# Patient Record
Sex: MALE | Race: WHITE | NOT HISPANIC OR LATINO | Employment: OTHER | URBAN - METROPOLITAN AREA
[De-identification: names, ages, dates, MRNs, and addresses within clinical notes are randomized per-mention and may not be internally consistent; named-entity substitution may affect disease eponyms.]

---

## 2017-02-10 ENCOUNTER — HOSPITAL ENCOUNTER (OUTPATIENT)
Dept: RADIOLOGY | Facility: MEDICAL CENTER | Age: 32
End: 2017-02-10
Attending: INTERNAL MEDICINE
Payer: MEDICARE

## 2017-02-10 DIAGNOSIS — R91.1 INCIDENTAL LUNG NODULE, LESS THAN OR EQUAL TO 3MM: ICD-10-CM

## 2017-02-10 PROCEDURE — 71250 CT THORAX DX C-: CPT

## 2017-02-14 ENCOUNTER — TELEPHONE (OUTPATIENT)
Dept: PULMONOLOGY | Facility: HOSPICE | Age: 32
End: 2017-02-14

## 2017-02-14 NOTE — TELEPHONE ENCOUNTER
1. Caller Name: Marty                      Call Back Number: [phone] (home)     2. Message: Marty called to make sure we had his CT he did this month. I informed him that Dr. Brady wanted him to follow up after his sleep study. Marty let me know he was not able to do the sleep study due to  issues but stated things should be changing sooner.    3. Patient approves office to leave a detailed voicemail/MyChart message: N\A

## 2017-02-16 DIAGNOSIS — R91.8 MULTIPLE LUNG NODULES: ICD-10-CM

## 2017-06-15 ENCOUNTER — HOSPITAL ENCOUNTER (EMERGENCY)
Facility: MEDICAL CENTER | Age: 32
End: 2017-06-15
Attending: EMERGENCY MEDICINE
Payer: MEDICARE

## 2017-06-15 VITALS
HEART RATE: 71 BPM | BODY MASS INDEX: 44.1 KG/M2 | OXYGEN SATURATION: 97 % | DIASTOLIC BLOOD PRESSURE: 67 MMHG | HEIGHT: 71 IN | WEIGHT: 315 LBS | TEMPERATURE: 97.5 F | SYSTOLIC BLOOD PRESSURE: 104 MMHG | RESPIRATION RATE: 20 BRPM

## 2017-06-15 DIAGNOSIS — G43.009 MIGRAINE WITHOUT AURA AND WITHOUT STATUS MIGRAINOSUS, NOT INTRACTABLE: ICD-10-CM

## 2017-06-15 DIAGNOSIS — B02.9 HERPES ZOSTER WITHOUT COMPLICATION: ICD-10-CM

## 2017-06-15 PROCEDURE — 99284 EMERGENCY DEPT VISIT MOD MDM: CPT

## 2017-06-15 PROCEDURE — 96374 THER/PROPH/DIAG INJ IV PUSH: CPT

## 2017-06-15 PROCEDURE — 96375 TX/PRO/DX INJ NEW DRUG ADDON: CPT

## 2017-06-15 PROCEDURE — 700105 HCHG RX REV CODE 258: Performed by: EMERGENCY MEDICINE

## 2017-06-15 PROCEDURE — 700111 HCHG RX REV CODE 636 W/ 250 OVERRIDE (IP): Performed by: EMERGENCY MEDICINE

## 2017-06-15 RX ORDER — METOCLOPRAMIDE 10 MG/1
10 TABLET ORAL 4 TIMES DAILY PRN
Qty: 20 TAB | Refills: 0 | Status: SHIPPED | OUTPATIENT
Start: 2017-06-15 | End: 2018-02-03

## 2017-06-15 RX ORDER — HYDROCODONE BITARTRATE AND ACETAMINOPHEN 5; 325 MG/1; MG/1
1 TABLET ORAL 2 TIMES DAILY PRN
Status: SHIPPED | COMMUNITY
End: 2019-08-16

## 2017-06-15 RX ORDER — MORPHINE SULFATE 4 MG/ML
4 INJECTION, SOLUTION INTRAMUSCULAR; INTRAVENOUS ONCE
Status: COMPLETED | OUTPATIENT
Start: 2017-06-15 | End: 2017-06-15

## 2017-06-15 RX ORDER — KETOROLAC TROMETHAMINE 30 MG/ML
30 INJECTION, SOLUTION INTRAMUSCULAR; INTRAVENOUS ONCE
Status: COMPLETED | OUTPATIENT
Start: 2017-06-15 | End: 2017-06-15

## 2017-06-15 RX ORDER — SODIUM CHLORIDE 9 MG/ML
1000 INJECTION, SOLUTION INTRAVENOUS ONCE
Status: COMPLETED | OUTPATIENT
Start: 2017-06-15 | End: 2017-06-15

## 2017-06-15 RX ORDER — TIZANIDINE 4 MG/1
4 TABLET ORAL EVERY 6 HOURS PRN
COMMUNITY

## 2017-06-15 RX ORDER — VALACYCLOVIR HYDROCHLORIDE 1 G/1
1000 TABLET, FILM COATED ORAL 3 TIMES DAILY
Qty: 21 TAB | Refills: 2 | Status: SHIPPED | OUTPATIENT
Start: 2017-06-15 | End: 2018-02-03

## 2017-06-15 RX ORDER — METOCLOPRAMIDE HYDROCHLORIDE 5 MG/ML
10 INJECTION INTRAMUSCULAR; INTRAVENOUS ONCE
Status: COMPLETED | OUTPATIENT
Start: 2017-06-15 | End: 2017-06-15

## 2017-06-15 RX ADMIN — MORPHINE SULFATE 4 MG: 4 INJECTION INTRAVENOUS at 14:02

## 2017-06-15 RX ADMIN — SODIUM CHLORIDE 1000 ML: 9 INJECTION, SOLUTION INTRAVENOUS at 13:05

## 2017-06-15 RX ADMIN — KETOROLAC TROMETHAMINE 30 MG: 30 INJECTION, SOLUTION INTRAMUSCULAR at 14:02

## 2017-06-15 RX ADMIN — METOCLOPRAMIDE 10 MG: 5 INJECTION, SOLUTION INTRAMUSCULAR; INTRAVENOUS at 13:05

## 2017-06-15 ASSESSMENT — PAIN SCALES - GENERAL
PAINLEVEL_OUTOF10: 6
PAINLEVEL_OUTOF10: 7

## 2017-06-15 NOTE — DISCHARGE INSTRUCTIONS
Migraine Headache  A migraine headache is an intense, throbbing pain on one or both sides of your head. A migraine can last for 30 minutes to several hours.  CAUSES   The exact cause of a migraine headache is not always known. However, a migraine may be caused when nerves in the brain become irritated and release chemicals that cause inflammation. This causes pain.  Certain things may also trigger migraines, such as:  · Alcohol.  · Smoking.  · Stress.  · Menstruation.  · Aged cheeses.  · Foods or drinks that contain nitrates, glutamate, aspartame, or tyramine.  · Lack of sleep.  · Chocolate.  · Caffeine.  · Hunger.  · Physical exertion.  · Fatigue.  · Medicines used to treat chest pain (nitroglycerine), birth control pills, estrogen, and some blood pressure medicines.  SIGNS AND SYMPTOMS  · Pain on one or both sides of your head.  · Pulsating or throbbing pain.  · Severe pain that prevents daily activities.  · Pain that is aggravated by any physical activity.  · Nausea, vomiting, or both.  · Dizziness.  · Pain with exposure to bright lights, loud noises, or activity.  · General sensitivity to bright lights, loud noises, or smells.  Before you get a migraine, you may get warning signs that a migraine is coming (aura). An aura may include:  · Seeing flashing lights.  · Seeing bright spots, halos, or zigzag lines.  · Having tunnel vision or blurred vision.  · Having feelings of numbness or tingling.  · Having trouble talking.  · Having muscle weakness.  DIAGNOSIS   A migraine headache is often diagnosed based on:  · Symptoms.  · Physical exam.  · A CT scan or MRI of your head. These imaging tests cannot diagnose migraines, but they can help rule out other causes of headaches.  TREATMENT  Medicines may be given for pain and nausea. Medicines can also be given to help prevent recurrent migraines.   HOME CARE INSTRUCTIONS  · Only take over-the-counter or prescription medicines for pain or discomfort as directed by your  health care provider. The use of long-term narcotics is not recommended.  · Lie down in a dark, quiet room when you have a migraine.  · Keep a journal to find out what may trigger your migraine headaches. For example, write down:  · What you eat and drink.  · How much sleep you get.  · Any change to your diet or medicines.  · Limit alcohol consumption.  · Quit smoking if you smoke.  · Get 7-9 hours of sleep, or as recommended by your health care provider.  · Limit stress.  · Keep lights dim if bright lights bother you and make your migraines worse.  SEEK IMMEDIATE MEDICAL CARE IF:   · Your migraine becomes severe.  · You have a fever.  · You have a stiff neck.  · You have vision loss.  · You have muscular weakness or loss of muscle control.  · You start losing your balance or have trouble walking.  · You feel faint or pass out.  · You have severe symptoms that are different from your first symptoms.  MAKE SURE YOU:   · Understand these instructions.  · Will watch your condition.  · Will get help right away if you are not doing well or get worse.     This information is not intended to replace advice given to you by your health care provider. Make sure you discuss any questions you have with your health care provider.     Document Released: 12/18/2006 Document Revised: 01/08/2016 Document Reviewed: 08/25/2014  [employer] Interactive Patient Education ©2016 [employer]  Shingles  Shingles, which is also known as herpes zoster, is an infection that causes a painful skin rash and fluid-filled blisters. Shingles is not related to genital herpes, which is a sexually transmitted infection.     Shingles only develops in people who:  · Have had chickenpox.  · Have received the chickenpox vaccine. (This is rare.)  CAUSES  Shingles is caused by varicella-zoster virus (VZV). This is the same virus that causes chickenpox. After exposure to VZV, the virus stays in the body in an inactive (dormant) state. Shingles develops if the  virus reactivates. This can happen many years after the initial exposure to VZV. It is not known what causes this virus to reactivate.  RISK FACTORS  People who have had chickenpox or received the chickenpox vaccine are at risk for shingles. Infection is more common in people who:  · Are older than age 50.  · Have a weakened defense (immune) system, such as those with HIV, AIDS, or cancer.  · Are taking medicines that weaken the immune system, such as transplant medicines.  · Are under great stress.  SYMPTOMS  Early symptoms of this condition include itching, tingling, and pain in an area on your skin. Pain may be described as burning, stabbing, or throbbing.  A few days or weeks after symptoms start, a painful red rash appears, usually on one side of the body in a bandlike or beltlike pattern. The rash eventually turns into fluid-filled blisters that break open, scab over, and dry up in about 2-3 weeks.  At any time during the infection, you may also develop:  · A fever.  · Chills.  · A headache.  · An upset stomach.  DIAGNOSIS  This condition is diagnosed with a skin exam. Sometimes, skin or fluid samples are taken from the blisters before a diagnosis is made. These samples are examined under a microscope or sent to a lab for testing.  TREATMENT  There is no specific cure for this condition. Your health care provider will probably prescribe medicines to help you manage pain, recover more quickly, and avoid long-term problems. Medicines may include:  · Antiviral drugs.  · Anti-inflammatory drugs.  · Pain medicines.  If the area involved is on your face, you may be referred to a specialist, such as an eye doctor (ophthalmologist) or an ear, nose, and throat (ENT) doctor to help you avoid eye problems, chronic pain, or disability.  HOME CARE INSTRUCTIONS  Medicines  · Take medicines only as directed by your health care provider.  · Apply an anti-itch or numbing cream to the affected area as directed by your health  care provider.  Blister and Rash Care  · Take a cool bath or apply cool compresses to the area of the rash or blisters as directed by your health care provider. This may help with pain and itching.  · Keep your rash covered with a loose bandage (dressing). Wear loose-fitting clothing to help ease the pain of material rubbing against the rash.  · Keep your rash and blisters clean with mild soap and cool water or as directed by your health care provider.  · Check your rash every day for signs of infection. These include redness, swelling, and pain that lasts or increases.  · Do not pick your blisters.  · Do not scratch your rash.  General Instructions  · Rest as directed by your health care provider.  · Keep all follow-up visits as directed by your health care provider. This is important.  · Until your blisters scab over, your infection can cause chickenpox in people who have never had it or been vaccinated against it. To prevent this from happening, avoid contact with other people, especially:  ¨ Babies.  ¨ Pregnant women.  ¨ Children who have eczema.  ¨ Elderly people who have transplants.  ¨ People who have chronic illnesses, such as leukemia or AIDS.  SEEK MEDICAL CARE IF:  · Your pain is not relieved with prescribed medicines.  · Your pain does not get better after the rash heals.  · Your rash looks infected. Signs of infection include redness, swelling, and pain that lasts or increases.  SEEK IMMEDIATE MEDICAL CARE IF:  · The rash is on your face or nose.  · You have facial pain, pain around your eye area, or loss of feeling on one side of your face.  · You have ear pain or you have ringing in your ear.  · You have loss of taste.  · Your condition gets worse.     This information is not intended to replace advice given to you by your health care provider. Make sure you discuss any questions you have with your health care provider.     Document Released: 12/18/2006 Document Revised: 01/08/2016 Document Reviewed:  10/29/2015  [employer] Interactive Patient Education ©2016 [employer]

## 2017-06-15 NOTE — ED AVS SNAPSHOT
Home Care Instructions                                                                                                                Marty Pedroza   MRN: [id number]    Department:  [facility], Emergency Dept   Date of Visit:  6/15/2017            [facility], Emergency Dept    [address] [place] [address]    Phone:  [phone]      You were seen by     Jamal Parkinson M.D.      Your Diagnosis Was     Migraine without aura and without status migrainosus, not intractable     G43.009       These are the medications you received during your hospitalization from 06/15/2017 1220 to 06/15/2017 1454     Date/Time Order Dose Route Action    06/15/2017 1305 NS infusion 1,000 mL 1,000 mL Intravenous New Bag    06/15/2017 1305 metoclopramide (REGLAN) injection 10 mg 10 mg Intravenous Given    06/15/2017 1402 morphine (pf) 4 mg/ml injection 4 mg 4 mg Intravenous Given    06/15/2017 1402 ketorolac (TORADOL) injection 30 mg 30 mg Intravenous Given      Follow-up Information     1. Follow up with Zack Bhakta D.N.P. In 1 week.    Specialty:  Family Medicine    Contact information    [address] [facility]  [address]  [place] [address]  [phone]        Medication Information     Review all of your home medications and newly ordered medications with your primary doctor and/or pharmacist as soon as possible. Follow medication instructions as directed by your doctor and/or pharmacist.     Please keep your complete medication list with you and share with your physician. Update the information when medications are discontinued, doses are changed, or new medications (including over-the-counter products) are added; and carry medication information at all times in the event of emergency situations.               Medication List      START taking these medications        Instructions    Morning Afternoon Evening Bedtime    metoclopramide 10 MG Tabs   Commonly known as:   REGLAN        Take 1 Tab by mouth 4 times a day as needed (nausea or headache).   Dose:  10 mg                        valacyclovir 1 GM Tabs   Commonly known as:  VALTREX        Take 1 Tab by mouth 3 times a day.   Dose:  1000 mg                          ASK your doctor about these medications        Instructions    Morning Afternoon Evening Bedtime    albuterol 108 (90 BASE) MCG/ACT Aers inhalation aerosol   Commonly known as:  PROAIR HFA        Inhale 2 Puffs by mouth every four hours as needed for Shortness of Breath (wheezing).   Dose:  2 Puff                        aspirin EC 81 MG Tbec   Commonly known as:  ECOTRIN        Take 81 mg by mouth every day.   Dose:  81 mg                        busPIRone 30 MG tablet   Commonly known as:  BUSPAR        Take 30 mg by mouth 2 Times a Day.   Dose:  30 mg                        hydrocodone-acetaminophen 5-325 MG Tabs per tablet   Commonly known as:  NORCO        Take 1 Tab by mouth every 8 hours as needed.   Dose:  1 Tab                        lisinopril 10 MG Tabs   Commonly known as:  PRINIVIL        Take 10 mg by mouth every day.   Dose:  10 mg                        lithium 300 MG Tabs   Commonly known as:  ESKALITH        Take 300 mg by mouth 5 Times a Day. 2po am 3 po qhs   Dose:  300 mg                        pantoprazole 40 MG Tbec   Commonly known as:  PROTONIX        Take 40 mg by mouth every day.   Dose:  40 mg                        pravastatin 40 MG tablet   Commonly known as:  PRAVACHOL        Take 40 mg by mouth every day.   Dose:  40 mg                        pregabalin 225 MG capsule   Commonly known as:  LYRICA        Take 225 mg by mouth 2 times a day.   Dose:  225 mg                        spironolactone 50 MG Tabs   Commonly known as:  ALDACTONE        Take 50 mg by mouth 2 times a day.   Dose:  50 mg                        tizanidine 4 MG Tabs   Commonly known as:  ZANAFLEX        Take 4 mg by mouth every 6 hours as needed.   Dose:  4 mg                         topiramate 50 MG tablet   Commonly known as:  TOPAMAX        Take 50 mg by mouth 2 times a day.   Dose:  50 mg                        TRADJENTA 5 MG Tabs tablet   Generic drug:  linagliptin        Take 5 mg by mouth every day.   Dose:  5 mg                        TRULICITY 1.5 MG/0.5ML Sopn   Generic drug:  Dulaglutide        Inject 1.5 mg as instructed every 7 days. SATURDAY   Dose:  1.5 mg                             Where to Get Your Medications      These medications were sent to [facility] [address] [place] [address] - [address] [facility]  [address] [facility] [place] [address]     Phone:  [phone]    - metoclopramide 10 MG Tabs  - valacyclovir 1 GM Tabs            Procedures and tests performed during your visit     IV SALINE LOCK        Discharge Instructions       Migraine Headache  A migraine headache is an intense, throbbing pain on one or both sides of your head. A migraine can last for 30 minutes to several hours.  CAUSES   The exact cause of a migraine headache is not always known. However, a migraine may be caused when nerves in the brain become irritated and release chemicals that cause inflammation. This causes pain.  Certain things may also trigger migraines, such as:  · Alcohol.  · Smoking.  · Stress.  · Menstruation.  · Aged cheeses.  · Foods or drinks that contain nitrates, glutamate, aspartame, or tyramine.  · Lack of sleep.  · Chocolate.  · Caffeine.  · Hunger.  · Physical exertion.  · Fatigue.  · Medicines used to treat chest pain (nitroglycerine), birth control pills, estrogen, and some blood pressure medicines.  SIGNS AND SYMPTOMS  · Pain on one or both sides of your head.  · Pulsating or throbbing pain.  · Severe pain that prevents daily activities.  · Pain that is aggravated by any physical activity.  · Nausea, vomiting, or both.  · Dizziness.  · Pain with exposure to bright lights, loud noises, or activity.  · General sensitivity to bright lights, loud noises, or  smells.  Before you get a migraine, you may get warning signs that a migraine is coming (aura). An aura may include:  · Seeing flashing lights.  · Seeing bright spots, halos, or zigzag lines.  · Having tunnel vision or blurred vision.  · Having feelings of numbness or tingling.  · Having trouble talking.  · Having muscle weakness.  DIAGNOSIS   A migraine headache is often diagnosed based on:  · Symptoms.  · Physical exam.  · A CT scan or MRI of your head. These imaging tests cannot diagnose migraines, but they can help rule out other causes of headaches.  TREATMENT  Medicines may be given for pain and nausea. Medicines can also be given to help prevent recurrent migraines.   HOME CARE INSTRUCTIONS  · Only take over-the-counter or prescription medicines for pain or discomfort as directed by your health care provider. The use of long-term narcotics is not recommended.  · Lie down in a dark, quiet room when you have a migraine.  · Keep a journal to find out what may trigger your migraine headaches. For example, write down:  · What you eat and drink.  · How much sleep you get.  · Any change to your diet or medicines.  · Limit alcohol consumption.  · Quit smoking if you smoke.  · Get 7-9 hours of sleep, or as recommended by your health care provider.  · Limit stress.  · Keep lights dim if bright lights bother you and make your migraines worse.  SEEK IMMEDIATE MEDICAL CARE IF:   · Your migraine becomes severe.  · You have a fever.  · You have a stiff neck.  · You have vision loss.  · You have muscular weakness or loss of muscle control.  · You start losing your balance or have trouble walking.  · You feel faint or pass out.  · You have severe symptoms that are different from your first symptoms.  MAKE SURE YOU:   · Understand these instructions.  · Will watch your condition.  · Will get help right away if you are not doing well or get worse.     This information is not intended to replace advice given to you by your  health care provider. Make sure you discuss any questions you have with your health care provider.     Document Released: 12/18/2006 Document Revised: 01/08/2016 Document Reviewed: 08/25/2014  [employer] Interactive Patient Education ©2016 [employer]  Shingles  Shingles, which is also known as herpes zoster, is an infection that causes a painful skin rash and fluid-filled blisters. Shingles is not related to genital herpes, which is a sexually transmitted infection.     Shingles only develops in people who:  · Have had chickenpox.  · Have received the chickenpox vaccine. (This is rare.)  CAUSES  Shingles is caused by varicella-zoster virus (VZV). This is the same virus that causes chickenpox. After exposure to VZV, the virus stays in the body in an inactive (dormant) state. Shingles develops if the virus reactivates. This can happen many years after the initial exposure to VZV. It is not known what causes this virus to reactivate.  RISK FACTORS  People who have had chickenpox or received the chickenpox vaccine are at risk for shingles. Infection is more common in people who:  · Are older than age 50.  · Have a weakened defense (immune) system, such as those with HIV, AIDS, or cancer.  · Are taking medicines that weaken the immune system, such as transplant medicines.  · Are under great stress.  SYMPTOMS  Early symptoms of this condition include itching, tingling, and pain in an area on your skin. Pain may be described as burning, stabbing, or throbbing.  A few days or weeks after symptoms start, a painful red rash appears, usually on one side of the body in a bandlike or beltlike pattern. The rash eventually turns into fluid-filled blisters that break open, scab over, and dry up in about 2-3 weeks.  At any time during the infection, you may also develop:  · A fever.  · Chills.  · A headache.  · An upset stomach.  DIAGNOSIS  This condition is diagnosed with a skin exam. Sometimes, skin or fluid samples are taken  from the blisters before a diagnosis is made. These samples are examined under a microscope or sent to a lab for testing.  TREATMENT  There is no specific cure for this condition. Your health care provider will probably prescribe medicines to help you manage pain, recover more quickly, and avoid long-term problems. Medicines may include:  · Antiviral drugs.  · Anti-inflammatory drugs.  · Pain medicines.  If the area involved is on your face, you may be referred to a specialist, such as an eye doctor (ophthalmologist) or an ear, nose, and throat (ENT) doctor to help you avoid eye problems, chronic pain, or disability.  HOME CARE INSTRUCTIONS  Medicines  · Take medicines only as directed by your health care provider.  · Apply an anti-itch or numbing cream to the affected area as directed by your health care provider.  Blister and Rash Care  · Take a cool bath or apply cool compresses to the area of the rash or blisters as directed by your health care provider. This may help with pain and itching.  · Keep your rash covered with a loose bandage (dressing). Wear loose-fitting clothing to help ease the pain of material rubbing against the rash.  · Keep your rash and blisters clean with mild soap and cool water or as directed by your health care provider.  · Check your rash every day for signs of infection. These include redness, swelling, and pain that lasts or increases.  · Do not pick your blisters.  · Do not scratch your rash.  General Instructions  · Rest as directed by your health care provider.  · Keep all follow-up visits as directed by your health care provider. This is important.  · Until your blisters scab over, your infection can cause chickenpox in people who have never had it or been vaccinated against it. To prevent this from happening, avoid contact with other people, especially:  ¨ Babies.  ¨ Pregnant women.  ¨ Children who have eczema.  ¨ Elderly people who have transplants.  ¨ People who have chronic  illnesses, such as leukemia or AIDS.  SEEK MEDICAL CARE IF:  · Your pain is not relieved with prescribed medicines.  · Your pain does not get better after the rash heals.  · Your rash looks infected. Signs of infection include redness, swelling, and pain that lasts or increases.  SEEK IMMEDIATE MEDICAL CARE IF:  · The rash is on your face or nose.  · You have facial pain, pain around your eye area, or loss of feeling on one side of your face.  · You have ear pain or you have ringing in your ear.  · You have loss of taste.  · Your condition gets worse.     This information is not intended to replace advice given to you by your health care provider. Make sure you discuss any questions you have with your health care provider.     Document Released: 12/18/2006 Document Revised: 01/08/2016 Document Reviewed: 10/29/2015  [employer] Interactive Patient Education ©2016 [employer]            Patient Information     Patient Information    Following emergency treatment: all patient requiring follow-up care must return either to a private physician or a clinic if your condition worsens before you are able to obtain further medical attention, please return to the emergency room.     Billing Information    At [facility], we work to make the billing process streamlined for our patients.  Our Representatives are here to answer any questions you may have regarding your hospital bill.  If you have insurance coverage and have supplied your insurance information to us, we will submit a claim to your insurer on your behalf.  Should you have any questions regarding your bill, we can be reached online or by phone as follows:  Online: You are able pay your bills online or live chat with our representatives about any billing questions you may have. We are here to help Monday - Friday from 8:00am to 7:30pm and 9:00am - 12:00pm on Saturdays.  Please visit [email or web address]  for more information.    Phone:  [phone] or [phone]    Please note that your emergency physician, surgeon, pathologist, radiologist, anesthesiologist, and other specialists are not employed by [facility] and will therefore bill separately for their services.  Please contact them directly for any questions concerning their bills at the numbers below:     Emergency Physician Services:  [phone]  [facility]:  [phone]  [facility]:  [phone]  [facility]:  [phone]    1. Your final bill may vary from the amount quoted upon discharge if all procedures are not complete at that time, or if your doctor has additional procedures of which we are not aware. You will receive an additional bill if you return to the Emergency Department at [facility] for suture removal regardless of the facility of which the sutures were placed.     2. Please arrange for settlement of this account at the emergency registration.    3. All self-pay accounts are due in full at the time of treatment.  If you are unable to meet this obligation then payment is expected within 4-5 days.     4. If you have had radiology studies (CT, X-ray, Ultrasound, MRI), you have received a preliminary result during your emergency department visit. Please contact the radiology department [phone] to receive a copy of your final result. Please discuss the Final result with your primary physician or with the follow up physician provided.     Crisis Hotline:  [place] Crisis Hotline:  4-622-UZMWEPI or 1-530.108.1344  [place] Crisis Hotline:    1-704.187.6236 or 236-230-6643         ED Discharge Follow Up Questions    1. In order to provide you with very good care, we would like to follow up with a phone call in the next few days.  May we have your permission to contact you?     YES /  NO    2. What is the best phone number to call you? (       )_____-__________    3. What is the best time to call you?       Morning  /  Afternoon  /  Evening                   Patient Signature:  ____________________________________________________________    Date:  ____________________________________________________________

## 2017-06-15 NOTE — ED NOTES
Reports exacerbation of chronic headache for the past two days, after a two-year hiatus.  Pt has been followed by Neurology with a Dx of migraine.

## 2017-06-15 NOTE — ED PROVIDER NOTES
ED Provider Note    CHIEF COMPLAINT  Chief Complaint   Patient presents with   • Migraine       HPI  Marty Pedroza is a 32 y.o. male who presents with headache. Started 3 days ago. He woke up with it feels like migraines have been the past. He's been relatively migraine free for the past 2 years since she's been on Topamax. This is the 1st headache that he said that will resolve. He has some photophobia and nausea and has had vomiting. He has no fever chills no thunderclap headache no neck pain. He also has another complaint of rash that he noticed on his right chest wall it's a burning sensation started with itching. He has no abdominal pain no numbness tingling weakness all other systems are negative    REVIEW OF SYSTEMS  See HPI for further details    PAST MEDICAL HISTORY  Past Medical History   Diagnosis Date   • Hypertension    • Degenerative disk disease    • Arthritis    • Hyperlipemia    • Bipolar 1 disorder    • Anxiety disorder    • PTSD (post-traumatic stress disorder)    • Depression    • Indigestion    • Snoring    • Renal disorder      stones   • Enlarged heart    • Unspecified asthma    • Sleep apnea      uses CPAP   • Anesthesia      wakes up violent   • Unspecified hemorrhagic conditions      slow to stop bleeding when cut or with IV's   • Type II or unspecified type diabetes mellitus without mention of complication, not stated as uncontrolled      oral meds   • Heart burn    • Pain      back and knee   • Breath shortness      with exertion   • Pancreatitis 3/2014   • Chronic obstructive pulmonary disease (CMS-HCC)    • Cancer (CMS-HCC)      skin ca bridge of nose   • GERD (gastroesophageal reflux disease) 7/21/2016   • Vitamin D deficiency 7/21/2016   • Prolapsed lumbosacral intervertebral disc 7/21/2016   • DJD (degenerative joint disease) 7/21/2016   • Fibromyalgia 7/21/2016   • Carpal tunnel syndrome 7/21/2016   • Peptic ulcer disease 7/21/2016   • Peripheral neuropathy 7/21/2016   •  "Tobacco abuse 7/21/2016   • Chronic back pain 7/21/2016   • Nephrolithiasis 7/21/2016   • Diabetes mellitus, type II (CMS-HCC) 7/21/2016   • Rheumatoid arthritis (CMS-HCC)    • Pneumonia    • Obesity    • Kidney stone    • Chickenpox    • Influenza    • Skin cancer        FAMILY HISTORY  Family History   Problem Relation Age of Onset   • Depression Mother    • Anxiety disorder Mother    • Bipolar disorder Mother    • Seizures Mother    • Heart Attack Father 28   • Diabetes Father    • Hypertension Father        SOCIAL HISTORY  Social History     Social History   • Marital Status:      Spouse Name: N/A   • Number of Children: N/A   • Years of Education: N/A     Social History Main Topics   • Smoking status: Current Every Day Smoker -- 1.00 packs/day for 8 years     Types: Cigarettes   • Smokeless tobacco: Never Used   • Alcohol Use: No   • Drug Use: No   • Sexual Activity: Not Asked     Other Topics Concern   • None     Social History Narrative       SURGICAL HISTORY  Past Surgical History   Procedure Laterality Date   • Tonsillectomy     • Other orthopedic surgery  2001     left knee, lateral release   • Other orthopedic surgery  2002     realign left knee   • Cystoscopy stent placement  5/22/2014     Performed by Dagoberto Everett M.D. at SURGERY [facility]   • Ureteroscopy  5/22/2014     Performed by Dagoberto Everett M.D. at SURGERY [facility]   • Lasertripsy  5/22/2014     Performed by Dagoberto Everett M.D. at [facility]       CURRENT MEDICATIONS  Home Medications     **Home medications have not yet been reviewed for this encounter**          ALLERGIES  Allergies   Allergen Reactions   • Pertussis Vaccine Anaphylaxis     As a child   • Simvastatin      pancreatitis       PHYSICAL EXAM  VITAL SIGNS: /86 mmHg  Pulse 88  Temp(Src) 36.4 °C (97.5 °F)  Resp 20  Ht 1.803 m (5' 11\")  Wt 145 kg (319 lb 10.7 oz)  BMI 44.60 kg/m2  SpO2 96%    Constitutional: Patient is alert and " oriented x3 in no distress   HENT: Moist mucous membranes  Eyes: No conjunctivitis or icterus  Neck: Trachea is midline neck is supple full range of motion  Lymphatic: Negative anterior cervical lymphadenopathy  Cardiovascular: Normal heart rate   Thorax & Lungs: Clear to auscultation  Abdomen: Obese nontender skin overlying the right upper abdomen chest wall is classic patchy zoster with a patch on the right flank region.  Neurologic: Motor is 5 over 5 in upper and lower extremities normal sensation he has normal finger to nose testing normal pronator drift testing. Cranial nerves: Pupils equally round reactive to light extraocular movements intact no facial asymmetry and motor sensation no tongue deviation symmetric palate  Extremities: No edema  Psychiatric: Affect normal, Judgment normal, Mood normal.           COURSE & MEDICAL DECISION MAKING  Pertinent Labs & Imaging studies reviewed. (See chart for details)  Patient has a typical migraine for himself IV will be established to be given a liter of fluid for pretreatment for Reglan and also for dehydration due to his episodes of vomiting. His rash is classic herpes zoster right middle place him on acyclovir with a refill. I told him he had minimal effect from this treatment possibly but a significant improvement in effect with a early treatment of recurrence.      Patient is being given Reglan IV with fluids with significant but not complete improvement. I am also giving him now Toradol 30 mg IV and 4 mg of morphine. He will be discharged with Reglan over-the-counter pain medications and follow-up with his physician. He is discharged also with valacyclovir for his shingles and is given referral follow-up and return precautions    FINAL IMPRESSION  1.   1. Migraine without aura and without status migrainosus, not intractable    2. Herpes zoster without complication        2.   3.         Electronically signed by: Jamal Parkinson, 6/15/2017 12:49 PM

## 2017-06-15 NOTE — ED AVS SNAPSHOT
OKWave Access Code: Activation code not generated  Current OKWave Status: Active    [employer]  A secure, online tool to manage your health information     [employer]’s OKWave® is a secure, online tool that connects you to your personalized health information from the privacy of your home -- day or night - making it very easy for you to manage your healthcare. Once the activation process is completed, you can even access your medical information using the OKWave shania, which is available for free in the Apple Shania store or Google Play store.     OKWave provides the following levels of access (as shown below):   My Chart Features   [facility] Primary Care Doctor [facility]  Specialists [facility]  Urgent  Care Non-[facility]  Primary Care  Doctor   Email your healthcare team securely and privately 24/7 X X X X   Manage appointments: schedule your next appointment; view details of past/upcoming appointments X      Request prescription refills. X      View recent personal medical records, including lab and immunizations X X X X   View health record, including health history, allergies, medications X X X X   Read reports about your outpatient visits, procedures, consult and ER notes X X X X   See your discharge summary, which is a recap of your hospital and/or ER visit that includes your diagnosis, lab results, and care plan. X X       How to register for OKWave:  1. Go to  [email or web address].  2. Click on the Sign Up Now box, which takes you to the New Member Sign Up page. You will need to provide the following information:  a. Enter your OKWave Access Code exactly as it appears at the top of this page. (You will not need to use this code after you’ve completed the sign-up process. If you do not sign up before the expiration date, you must request a new code.)   b. Enter your date of birth.   c. Enter your home email address.   d. Click Submit, and follow the next screen’s instructions.  3. Create a OKWave ID. This will  be your [employer] login ID and cannot be changed, so think of one that is secure and easy to remember.  4. Create a [employer] password. You can change your password at any time.  5. Enter your Password Reset Question and Answer. This can be used at a later time if you forget your password.   6. Enter your e-mail address. This allows you to receive e-mail notifications when new information is available in [employer].  7. Click Sign Up. You can now view your health information.    For assistance activating your [employer] account, call [phone]

## 2017-06-15 NOTE — ED NOTES
"Pt reclining in a position of comfort with 0 s/s distress noted. IV WNL. Pt states that he has had \"some relief.\"  "

## 2017-06-15 NOTE — ED NOTES
Assumed care of pt to cover lunch break for primary nurse. Plan of care reviewed with pt. Call light within reach. Will continue to monitor.

## 2017-06-15 NOTE — ED AVS SNAPSHOT
6/15/2017    Marty Brienjavier Pedroza  [address] [place] [address]    Dear Marty:    [facility] wants to ensure your discharge home is safe and you or your loved ones have had all of your questions answered regarding your care after you leave the hospital.    Below is a list of resources and contact information should you have any questions regarding your hospital stay, follow-up instructions, or active medical symptoms.    Questions or Concerns Regarding… Contact   Medical Questions Related to Your Discharge  (7 days a week, 8am-5pm) Contact a Nurse Care Coordinator   [phone]   Medical Questions Not Related to Your Discharge  (24 hours a day / 7 days a week)  Contact the Nurse Health Line   [phone]    Medications or Discharge Instructions Refer to your discharge packet   or contact your [facility] Primary Care Provider   [phone]   Follow-up Appointment(s) Schedule your appointment via PhotoTLC   or contact Scheduling [phone]   Billing Review your statement via PhotoTLC  or contact Billing [phone]   Medical Records Review your records via PhotoTLC   or contact Medical Records [phone]     You may receive a telephone call within two days of discharge. This call is to make certain you understand your discharge instructions and have the opportunity to have any questions answered. You can also easily access your medical information, test results and upcoming appointments via the PhotoTLC free online health management tool. You can learn more and sign up at [employer]PhotoTLC. For assistance setting up your PhotoTLC account, please call [phone].    Once again, we want to ensure your discharge home is safe and that you have a clear understanding of any next steps in your care. If you have any questions or concerns, please do not hesitate to contact us, we are here for you. Thank you for choosing [facility] for your healthcare needs.    Sincerely,    Your [facility] Healthcare Team

## 2017-07-26 ENCOUNTER — HOSPITAL ENCOUNTER (OUTPATIENT)
Dept: RADIOLOGY | Facility: MEDICAL CENTER | Age: 32
End: 2017-07-26
Attending: NURSE PRACTITIONER
Payer: MEDICARE

## 2017-07-26 DIAGNOSIS — M79.632 PAIN OF LEFT FOREARM: ICD-10-CM

## 2017-07-26 PROCEDURE — 73218 MRI UPPER EXTREMITY W/O DYE: CPT | Mod: LT

## 2018-02-03 ENCOUNTER — RESOLUTE PROFESSIONAL BILLING HOSPITAL PROF FEE (OUTPATIENT)
Dept: HOSPITALIST | Facility: MEDICAL CENTER | Age: 33
End: 2018-02-03
Payer: MEDICARE

## 2018-02-03 ENCOUNTER — HOSPITAL ENCOUNTER (OUTPATIENT)
Facility: MEDICAL CENTER | Age: 33
End: 2018-02-04
Attending: EMERGENCY MEDICINE | Admitting: INTERNAL MEDICINE
Payer: MEDICARE

## 2018-02-03 ENCOUNTER — APPOINTMENT (OUTPATIENT)
Dept: RADIOLOGY | Facility: MEDICAL CENTER | Age: 33
End: 2018-02-03
Attending: EMERGENCY MEDICINE
Payer: MEDICARE

## 2018-02-03 PROBLEM — R07.9 CHEST PAIN: Status: ACTIVE | Noted: 2018-02-03

## 2018-02-03 LAB
ALBUMIN SERPL BCP-MCNC: 4.1 G/DL (ref 3.2–4.9)
ALBUMIN/GLOB SERPL: 1.9 G/DL
ALP SERPL-CCNC: 53 U/L (ref 30–99)
ALT SERPL-CCNC: 14 U/L (ref 2–50)
ANION GAP SERPL CALC-SCNC: 5 MMOL/L (ref 0–11.9)
AST SERPL-CCNC: 13 U/L (ref 12–45)
BASOPHILS # BLD AUTO: 1.5 % (ref 0–1.8)
BASOPHILS # BLD: 0.11 K/UL (ref 0–0.12)
BILIRUB SERPL-MCNC: 0.6 MG/DL (ref 0.1–1.5)
BUN SERPL-MCNC: 12 MG/DL (ref 8–22)
CALCIUM SERPL-MCNC: 9 MG/DL (ref 8.5–10.5)
CHLORIDE SERPL-SCNC: 106 MMOL/L (ref 96–112)
CO2 SERPL-SCNC: 28 MMOL/L (ref 20–33)
CREAT SERPL-MCNC: 0.85 MG/DL (ref 0.5–1.4)
DEPRECATED D DIMER PPP IA-ACNC: <200 NG/ML(D-DU)
EKG IMPRESSION: NORMAL
EOSINOPHIL # BLD AUTO: 0.44 K/UL (ref 0–0.51)
EOSINOPHIL NFR BLD: 6.1 % (ref 0–6.9)
ERYTHROCYTE [DISTWIDTH] IN BLOOD BY AUTOMATED COUNT: 37.2 FL (ref 35.9–50)
GLOBULIN SER CALC-MCNC: 2.2 G/DL (ref 1.9–3.5)
GLUCOSE BLD-MCNC: 126 MG/DL (ref 65–99)
GLUCOSE BLD-MCNC: 131 MG/DL (ref 65–99)
GLUCOSE SERPL-MCNC: 92 MG/DL (ref 65–99)
HCT VFR BLD AUTO: 42.9 % (ref 42–52)
HGB BLD-MCNC: 15.1 G/DL (ref 14–18)
IMM GRANULOCYTES # BLD AUTO: 0.02 K/UL (ref 0–0.11)
IMM GRANULOCYTES NFR BLD AUTO: 0.3 % (ref 0–0.9)
LIPASE SERPL-CCNC: 26 U/L (ref 11–82)
LYMPHOCYTES # BLD AUTO: 2.47 K/UL (ref 1–4.8)
LYMPHOCYTES NFR BLD: 34.1 % (ref 22–41)
MCH RBC QN AUTO: 31.3 PG (ref 27–33)
MCHC RBC AUTO-ENTMCNC: 35.2 G/DL (ref 33.7–35.3)
MCV RBC AUTO: 88.8 FL (ref 81.4–97.8)
MONOCYTES # BLD AUTO: 0.34 K/UL (ref 0–0.85)
MONOCYTES NFR BLD AUTO: 4.7 % (ref 0–13.4)
NEUTROPHILS # BLD AUTO: 3.87 K/UL (ref 1.82–7.42)
NEUTROPHILS NFR BLD: 53.3 % (ref 44–72)
NRBC # BLD AUTO: 0 K/UL
NRBC BLD-RTO: 0 /100 WBC
PLATELET # BLD AUTO: 215 K/UL (ref 164–446)
PMV BLD AUTO: 8.8 FL (ref 9–12.9)
POTASSIUM SERPL-SCNC: 4.3 MMOL/L (ref 3.6–5.5)
PROT SERPL-MCNC: 6.3 G/DL (ref 6–8.2)
RBC # BLD AUTO: 4.83 M/UL (ref 4.7–6.1)
SODIUM SERPL-SCNC: 139 MMOL/L (ref 135–145)
TROPONIN I SERPL-MCNC: <0.01 NG/ML (ref 0–0.04)
WBC # BLD AUTO: 7.3 K/UL (ref 4.8–10.8)

## 2018-02-03 PROCEDURE — 84484 ASSAY OF TROPONIN QUANT: CPT

## 2018-02-03 PROCEDURE — 93005 ELECTROCARDIOGRAM TRACING: CPT | Performed by: EMERGENCY MEDICINE

## 2018-02-03 PROCEDURE — 85379 FIBRIN DEGRADATION QUANT: CPT

## 2018-02-03 PROCEDURE — 36415 COLL VENOUS BLD VENIPUNCTURE: CPT

## 2018-02-03 PROCEDURE — 85025 COMPLETE CBC W/AUTO DIFF WBC: CPT

## 2018-02-03 PROCEDURE — 96372 THER/PROPH/DIAG INJ SC/IM: CPT

## 2018-02-03 PROCEDURE — 700111 HCHG RX REV CODE 636 W/ 250 OVERRIDE (IP): Performed by: INTERNAL MEDICINE

## 2018-02-03 PROCEDURE — 71045 X-RAY EXAM CHEST 1 VIEW: CPT

## 2018-02-03 PROCEDURE — 99285 EMERGENCY DEPT VISIT HI MDM: CPT

## 2018-02-03 PROCEDURE — 99220 PR INITIAL OBSERVATION CARE,LEVL III: CPT | Performed by: INTERNAL MEDICINE

## 2018-02-03 PROCEDURE — 80053 COMPREHEN METABOLIC PANEL: CPT

## 2018-02-03 PROCEDURE — 700102 HCHG RX REV CODE 250 W/ 637 OVERRIDE(OP): Performed by: INTERNAL MEDICINE

## 2018-02-03 PROCEDURE — 83690 ASSAY OF LIPASE: CPT

## 2018-02-03 PROCEDURE — A9270 NON-COVERED ITEM OR SERVICE: HCPCS | Performed by: INTERNAL MEDICINE

## 2018-02-03 PROCEDURE — G0378 HOSPITAL OBSERVATION PER HR: HCPCS

## 2018-02-03 PROCEDURE — 83036 HEMOGLOBIN GLYCOSYLATED A1C: CPT

## 2018-02-03 PROCEDURE — 93005 ELECTROCARDIOGRAM TRACING: CPT

## 2018-02-03 PROCEDURE — 82962 GLUCOSE BLOOD TEST: CPT | Mod: 91

## 2018-02-03 RX ORDER — AMOXICILLIN 250 MG
2 CAPSULE ORAL 2 TIMES DAILY
Status: DISCONTINUED | OUTPATIENT
Start: 2018-02-03 | End: 2018-02-04 | Stop reason: HOSPADM

## 2018-02-03 RX ORDER — BUSPIRONE HYDROCHLORIDE 10 MG/1
10 TABLET ORAL EVERY MORNING
COMMUNITY

## 2018-02-03 RX ORDER — BUSPIRONE HYDROCHLORIDE 10 MG/1
10 TABLET ORAL EVERY MORNING
Status: DISCONTINUED | OUTPATIENT
Start: 2018-02-04 | End: 2018-02-04 | Stop reason: HOSPADM

## 2018-02-03 RX ORDER — ASPIRIN 300 MG/1
300 SUPPOSITORY RECTAL DAILY
Status: DISCONTINUED | OUTPATIENT
Start: 2018-02-03 | End: 2018-02-04 | Stop reason: HOSPADM

## 2018-02-03 RX ORDER — POLYETHYLENE GLYCOL 3350 17 G/17G
1 POWDER, FOR SOLUTION ORAL
Status: DISCONTINUED | OUTPATIENT
Start: 2018-02-03 | End: 2018-02-04 | Stop reason: HOSPADM

## 2018-02-03 RX ORDER — DEXTROSE MONOHYDRATE 25 G/50ML
25 INJECTION, SOLUTION INTRAVENOUS
Status: DISCONTINUED | OUTPATIENT
Start: 2018-02-03 | End: 2018-02-04 | Stop reason: HOSPADM

## 2018-02-03 RX ORDER — OMEPRAZOLE 20 MG/1
20 CAPSULE, DELAYED RELEASE ORAL EVERY MORNING
Status: DISCONTINUED | OUTPATIENT
Start: 2018-02-04 | End: 2018-02-04 | Stop reason: HOSPADM

## 2018-02-03 RX ORDER — TIZANIDINE 4 MG/1
4 TABLET ORAL EVERY 6 HOURS PRN
Status: DISCONTINUED | OUTPATIENT
Start: 2018-02-03 | End: 2018-02-04 | Stop reason: HOSPADM

## 2018-02-03 RX ORDER — BISACODYL 10 MG
10 SUPPOSITORY, RECTAL RECTAL
Status: DISCONTINUED | OUTPATIENT
Start: 2018-02-03 | End: 2018-02-04 | Stop reason: HOSPADM

## 2018-02-03 RX ORDER — HYDROCODONE BITARTRATE AND ACETAMINOPHEN 5; 325 MG/1; MG/1
1 TABLET ORAL 2 TIMES DAILY PRN
Status: DISCONTINUED | OUTPATIENT
Start: 2018-02-03 | End: 2018-02-04 | Stop reason: HOSPADM

## 2018-02-03 RX ORDER — LITHIUM CARBONATE 300 MG
300 TABLET ORAL EVERY MORNING
Status: DISCONTINUED | OUTPATIENT
Start: 2018-02-04 | End: 2018-02-04 | Stop reason: HOSPADM

## 2018-02-03 RX ORDER — ASPIRIN 325 MG
325 TABLET ORAL DAILY
Status: DISCONTINUED | OUTPATIENT
Start: 2018-02-03 | End: 2018-02-04 | Stop reason: HOSPADM

## 2018-02-03 RX ORDER — ASPIRIN 81 MG/1
324 TABLET, CHEWABLE ORAL DAILY
Status: DISCONTINUED | OUTPATIENT
Start: 2018-02-03 | End: 2018-02-04 | Stop reason: HOSPADM

## 2018-02-03 RX ADMIN — HYDROCODONE BITARTRATE AND ACETAMINOPHEN 1 TABLET: 5; 325 TABLET ORAL at 17:10

## 2018-02-03 RX ADMIN — ENOXAPARIN SODIUM 40 MG: 100 INJECTION SUBCUTANEOUS at 16:47

## 2018-02-03 ASSESSMENT — PATIENT HEALTH QUESTIONNAIRE - PHQ9
SUM OF ALL RESPONSES TO PHQ9 QUESTIONS 1 AND 2: 0
SUM OF ALL RESPONSES TO PHQ QUESTIONS 1-9: 0
1. LITTLE INTEREST OR PLEASURE IN DOING THINGS: NOT AT ALL
2. FEELING DOWN, DEPRESSED, IRRITABLE, OR HOPELESS: NOT AT ALL

## 2018-02-03 ASSESSMENT — PAIN SCALES - GENERAL
PAINLEVEL_OUTOF10: 7
PAINLEVEL_OUTOF10: 7
PAINLEVEL_OUTOF10: 3

## 2018-02-03 ASSESSMENT — LIFESTYLE VARIABLES
EVER_SMOKED: YES
ALCOHOL_USE: NO

## 2018-02-03 NOTE — ED PROVIDER NOTES
ED Provider Note    Scribed for Paul Cervantes D.O. by Humaira Hill. 2/3/2018  1:13 PM    Primary care provider: Zack Bhakta D.N.P.  Means of arrival: walk in   History obtained from: patient   History limited by: none    CHIEF COMPLAINT  Chief Complaint   Patient presents with   • Chest Pain     x1wk, now radiating up into neck this am, +tingling in left hand       HPI  Marty Pedroza is a 32 y.o. male who presents to the Emergency Department for intermittent chest pain onset one week ago that has been progressively worse since onset. His chest pain intermittently radiates to his left arm and left neck. Typically, his pain is dull in nature but felt more pressure like today with a sharp pain to his neck. Exertion and arguments with his wife exacerbate his pain. Patient confirms history of GERD but reports his chest pain is not consistent with his acid reflux. He took aspirin a few days ago with no relief. He denies abdominal pain and fevers.       Cardiac Risk Factors:  No Age > 65  Aspirin use within 7 days  No prior history of coronary artery disease  Diabetes  hyperlipidemia   No hypertension  No obesity  Family history of coronary artery disease at a young age of 27  Former smoker since 11.   No drugs (methamphetamine or cocaine)  No history of aortic aneurysm   No history of aortic dissection   No history of deep vein thrombosis or pulmonary embolism   No hormone replacement  No oral birth control      REVIEW OF SYSTEMS  Pertinent positives include chest pain, neck pain.   Pertinent negatives include no abdominal pain, fever.    All other systems reviewed and negative. C.       PAST MEDICAL HISTORY  Past Medical History:   Diagnosis Date   • Anesthesia     wakes up violent   • Anxiety disorder    • Arthritis    • Bipolar 1 disorder    • Breath shortness     with exertion   • Cancer (CMS-HCC)     skin ca bridge of nose   • Carpal tunnel syndrome 7/21/2016   • Chickenpox    • Chronic back  pain 7/21/2016   • Chronic obstructive pulmonary disease (CMS-HCC)    • Degenerative disk disease    • Depression    • Diabetes mellitus, type II (CMS-HCC) 7/21/2016   • DJD (degenerative joint disease) 7/21/2016   • Enlarged heart    • Fibromyalgia 7/21/2016   • GERD (gastroesophageal reflux disease) 7/21/2016   • Heart burn    • Hyperlipemia    • Hypertension    • Indigestion    • Influenza    • Kidney stone    • Nephrolithiasis 7/21/2016   • Obesity    • Pain     back and knee   • Pancreatitis 3/2014   • Peptic ulcer disease 7/21/2016   • Peripheral neuropathy (CMS-HCC) 7/21/2016   • Pneumonia    • Prolapsed lumbosacral intervertebral disc 7/21/2016   • PTSD (post-traumatic stress disorder)    • Renal disorder     stones   • Rheumatoid arthritis (CMS-HCC)    • Skin cancer    • Sleep apnea     uses CPAP   • Snoring    • Tobacco abuse 7/21/2016   • Type II or unspecified type diabetes mellitus without mention of complication, not stated as uncontrolled     oral meds   • Unspecified asthma(493.90)    • Unspecified hemorrhagic conditions     slow to stop bleeding when cut or with IV's   • Vitamin D deficiency 7/21/2016       SURGICAL HISTORY  Past Surgical History:   Procedure Laterality Date   • CYSTOSCOPY STENT PLACEMENT  5/22/2014    Performed by Dagoberto Everett M.D. at SURGERY [facility]   • URETEROSCOPY  5/22/2014    Performed by Dagoberto Everett M.D. at SURGERY [facility]   • LASERTRIPSY  5/22/2014    Performed by Dagoberto Everett M.D. at SUR[facility]ERY [facility]   • OTHER ORTHOPEDIC SURGERY  2002    realign left knee   • OTHER ORTHOPEDIC SURGERY  2001    left knee, lateral release   • TONSILLECTOMY          SOCIAL HISTORY  Social History   Substance Use Topics   • Smoking status: Current Every Day Smoker     Packs/day: 1.00     Years: 8.00     Types: Cigarettes   • Smokeless tobacco: Never Used   • Alcohol use No      History   Drug Use No       FAMILY HISTORY  Family History   Problem Relation Age of  "Onset   • Depression Mother    • Anxiety disorder Mother    • Bipolar disorder Mother    • Seizures Mother    • Heart Attack Father 28   • Diabetes Father    • Hypertension Father        CURRENT MEDICATIONS  Home Medications     Reviewed by Sarah Suarez (Pharmacy Tech) on 02/03/18 at 1419  Med List Status: Complete   Medication Last Dose Status   aspirin EC (ECOTRIN) 81 MG Tablet Delayed Response 2/3/2018 Active   busPIRone (BUSPAR) 10 MG Tab 2/3/2018 Active   Dulaglutide (TRULICITY) 1.5 MG/0.5ML Solution Pen-injector 1/30/2018 Active   hydrocodone-acetaminophen (NORCO) 5-325 MG Tab per tablet 2/3/2018 Active   lithium (ESKALITH) 300 MG TABS 2/3/2018 Active   pantoprazole (PROTONIX) 40 MG TBEC 2/3/2018 Active   tizanidine (ZANAFLEX) 4 MG Tab >week Active                ALLERGIES  Allergies   Allergen Reactions   • Pertussis Vaccine Anaphylaxis     Childhood reaction   • Simvastatin Unspecified     Pancreatitis       PHYSICAL EXAM  VITAL SIGNS: /92   Pulse 67   Temp 36.4 °C (97.6 °F) (Temporal)   Resp 16   Ht 1.803 m (5' 11\")   Wt (!) 127 kg (280 lb)   SpO2 97%   BMI 39.05 kg/m²   Nursing notes and vitals reviewed.  Constitutional: Well developed, Well nourished, No acute distress, Non-toxic appearance.   Eyes: PERRLA, EOMI, Conjunctiva normal, No discharge.   Cardiovascular: Normal heart rate, Normal rhythm, No murmurs, No rubs, No gallops. No bruit.   Thorax & Lungs: No respiratory distress, No rales, No rhonchi, No wheezing, No chest tenderness.   Abdomen: Bowel sounds normal, Soft, No tenderness, No guarding, No rebound, No masses, No pulsatile masses.   Skin: Warm, Dry, No erythema, No rash.   Musculoskeletal: Intact distal pulses, No edema, No cyanosis, No clubbing. Good range of motion in all major joints. No tenderness to palpation or major deformities noted, no CVA tenderness, no midline back tenderness.   Neurologic: Alert & oriented x 3, Normal motor function, Normal sensory " function, No focal deficits noted.  Psychiatric: Affect normal for clinical presentation.        DIAGNOSTIC STUDIES/PROCEDURES  LABS  Results for orders placed or performed during the hospital encounter of 02/03/18   CBC w/ Differential   Result Value Ref Range    WBC 7.3 4.8 - 10.8 K/uL    RBC 4.83 4.70 - 6.10 M/uL    Hemoglobin 15.1 14.0 - 18.0 g/dL    Hematocrit 42.9 42.0 - 52.0 %    MCV 88.8 81.4 - 97.8 fL    MCH 31.3 27.0 - 33.0 pg    MCHC 35.2 33.7 - 35.3 g/dL    RDW 37.2 35.9 - 50.0 fL    Platelet Count 215 164 - 446 K/uL    MPV 8.8 (L) 9.0 - 12.9 fL    Neutrophils-Polys 53.30 44.00 - 72.00 %    Lymphocytes 34.10 22.00 - 41.00 %    Monocytes 4.70 0.00 - 13.40 %    Eosinophils 6.10 0.00 - 6.90 %    Basophils 1.50 0.00 - 1.80 %    Immature Granulocytes 0.30 0.00 - 0.90 %    Nucleated RBC 0.00 /100 WBC    Neutrophils (Absolute) 3.87 1.82 - 7.42 K/uL    Lymphs (Absolute) 2.47 1.00 - 4.80 K/uL    Monos (Absolute) 0.34 0.00 - 0.85 K/uL    Eos (Absolute) 0.44 0.00 - 0.51 K/uL    Baso (Absolute) 0.11 0.00 - 0.12 K/uL    Immature Granulocytes (abs) 0.02 0.00 - 0.11 K/uL    NRBC (Absolute) 0.00 K/uL   Troponin STAT   Result Value Ref Range    Troponin I <0.01 0.00 - 0.04 ng/mL   Complete Metabolic Panel (CMP)   Result Value Ref Range    Sodium 139 135 - 145 mmol/L    Potassium 4.3 3.6 - 5.5 mmol/L    Chloride 106 96 - 112 mmol/L    Co2 28 20 - 33 mmol/L    Anion Gap 5.0 0.0 - 11.9    Glucose 92 65 - 99 mg/dL    Bun 12 8 - 22 mg/dL    Creatinine 0.85 0.50 - 1.40 mg/dL    Calcium 9.0 8.5 - 10.5 mg/dL    AST(SGOT) 13 12 - 45 U/L    ALT(SGPT) 14 2 - 50 U/L    Alkaline Phosphatase 53 30 - 99 U/L    Total Bilirubin 0.6 0.1 - 1.5 mg/dL    Albumin 4.1 3.2 - 4.9 g/dL    Total Protein 6.3 6.0 - 8.2 g/dL    Globulin 2.2 1.9 - 3.5 g/dL    A-G Ratio 1.9 g/dL   Lipase   Result Value Ref Range    Lipase 26 11 - 82 U/L   D-DIMER   Result Value Ref Range    D-Dimer Screen <200 <250 ng/mL(D-DU)   ESTIMATED GFR   Result Value Ref Range     GFR If African American >60 >60 mL/min/1.73 m 2    GFR If Non African American >60 >60 mL/min/1.73 m 2   EKG (NOW)   Result Value Ref Range    Report       [facility] Emergency Dept[facility]    Test Date:  2018  Pt Name:    [facility] SOTOMAYOR                  Department: ER  MRN:        [id number]                      Room:  Gender:     Male                         Technician: [id number]  :        1985                   Requested By:ER TRIAGE PROTOCOL  Order #:    [id number]                    Reading MD: RIGOBERTO GOVEA DO    Measurements  Intervals                                Axis  Rate:       73                           P:          12  DC:         176                          QRS:        3  QRSD:       96                           T:          36  QT:         348  QTc:        384    Interpretive Statements  SINUS RHYTHM  Compared to ECG 2014 06:49:21  No significant changes    Electronically Signed On 2-3-2018 15:06:45 PST by RIGOBERTO GOVEA DO     All labs reviewed by me.        RADIOLOGY  DX-CHEST-PORTABLE (1 VIEW)   Final Result      No evidence of acute cardiopulmonary process.      ECHO-REST STRESS W/O CONTRAST (Order not available for [facility])    (Results Pending)   The radiologist's interpretation of all radiological studies have been reviewed by me.        COURSE & MEDICAL DECISION MAKING  Pertinent Labs & Imaging studies reviewed. (See chart for details)    1:13 PM - Patient seen and examined at bedside.  Ordered DX chest, estimated GFR, CBC, troponin, CMP, lipase, D dimer and EKG to evaluate his symptoms.     12:13 PM Paged hospitalist.     12:15 PM  Patient reevaluated at bedside. Discussed plan of care with patient. He agrees to admission.     12:20 PM Consult with Dr. Velázquez, hospitalist, who agrees to admit the patient.     This is a charming 32 y.o. male that presents with chest pain. The patient does have significant risk factors including diabetes, high  cholesterol, smoking history and family history for heart disease. EKG is negative, troponin is negative, the patient said technically, tachycardic and do not believe he is experiencing a pulmonary embolism. In addition, the patient has a negative d-dimer and I do not suspect a pulmonary embolism or aortic dissection. The patient is being admitted to Dr. Farley for further evaluation and management.      DISPOSITION:  Patient will be admitted to Dr. Velázquez in guarded condition.      FINAL IMPRESSION  Chest pain    I, Humaira Hill (Naila), am scribing for, and in the presence of, Paul Cervantes D.O  Electronically signed by: Humaira Hill (Donaldibjoss), 2/3/2018  IPaul D.O. personally performed the services described in this documentation, as scribed by Humaira Hill in my presence, and it is both accurate and complete.    The note accurately reflects work and decisions made by me.  Paul Cervantes  2/3/2018  3:12 PM

## 2018-02-03 NOTE — DISCHARGE PLANNING
Facesheet updated to include wife as emergency contact.    Care Transition Team Assessment    Information Source  Orientation : Oriented x 4  Information Given By: Patient  Who is responsible for making decisions for patient? : Patient     Elopement Risk  Legal Hold: No    Interdisciplinary Discharge Planning  Does Admitting Nurse Feel This Could be a Complex Discharge?: No  Primary Care Physician: Zack Bhakta DNP  Lives with - Patient's Self Care Capacity: Spouse, Child Less than 18 Years of Age  Support Systems: Spouse / Significant Other, Family Member(s)  Housing / Facility: [address]  Do You Take your Prescribed Medications Regularly: Yes  Able to Return to Previous ADL's: Yes  Mobility Issues: No  Prior Services: None  Patient Expects to be Discharged to:: home  Durable Medical Equipment: Other - Specify (CPAP)  DME Provider / Phone: can't recall, set up in [place]    Discharge Preparedness  What are your discharge supports?: Spouse  Prior Functional Level: Independent with Activities of Daily Living  Difficulity with ADLs: None  Difficulity with IADLs: None    Functional Assesment  Prior Functional Level: Independent with Activities of Daily Living    Finances  Financial Barriers to Discharge: No  Prescription Coverage: Yes (pt states yes, but recent insurance card not yet scanned)     Discharge Risks or Barriers  Discharge risks or barriers?: No    Anticipated Discharge Information  Anticipated discharge disposition: Home  Discharge Address: facesheet verified

## 2018-02-03 NOTE — ASSESSMENT & PLAN NOTE
Presents with chest pain radiating to left arm  Young, however has several risk factors  No history of drug dependence  Telemetry, trend troponins, stress test if troponins are negative, ASA, ordered lipid panel.  Low HDL on lipid panel; recommend exercise, dietary modifications  Carotid Duplex, no hemodynamically significanyt stenoses of the carotids  Stress test negative for ischemia  At discharge date he wants to go home. No chest pain. He is afebrile, hemodynamically stable. He ambulated and tolerated food. Wife at bedside.  Follow up with Zack Bhakta D.N.P. In 1-2 weeks. If still having chest pain, can consider referral to Cardiologyclinic for elective ischemic evaluation
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
## 2018-02-03 NOTE — ED NOTES
"EKG completed on patient, signed by ERP ( [place])  Patient's wife upset about having patient lie on gurney for EKG, \"why cant you do the EKG on the fucking chair\" explained EKG procedure. Patient's wife taking pictures on camera. Patient in NAD, playing on Cellphone.  "

## 2018-02-03 NOTE — ED TRIAGE NOTES
Pt to triage after EKG.  Chief Complaint   Patient presents with   • Chest Pain     x1wk, now radiating up into neck this am, +tingling in left hand     Pt also reports an episode where his hand color was botchy this am.
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
## 2018-02-03 NOTE — ASSESSMENT & PLAN NOTE
Sliding scale insulin ordered. Obtain A1c  Blood glucoses here are lower side  Can consider doing diet control and discosntinuing insulin/oral hypoglycemics.

## 2018-02-04 ENCOUNTER — APPOINTMENT (OUTPATIENT)
Dept: RADIOLOGY | Facility: MEDICAL CENTER | Age: 33
End: 2018-02-04
Attending: INTERNAL MEDICINE
Payer: MEDICARE

## 2018-02-04 VITALS
TEMPERATURE: 97.4 F | OXYGEN SATURATION: 96 % | WEIGHT: 294.31 LBS | SYSTOLIC BLOOD PRESSURE: 127 MMHG | HEART RATE: 73 BPM | HEIGHT: 71 IN | DIASTOLIC BLOOD PRESSURE: 80 MMHG | RESPIRATION RATE: 16 BRPM | BODY MASS INDEX: 41.2 KG/M2

## 2018-02-04 LAB
ANION GAP SERPL CALC-SCNC: 5 MMOL/L (ref 0–11.9)
BUN SERPL-MCNC: 15 MG/DL (ref 8–22)
CALCIUM SERPL-MCNC: 8.8 MG/DL (ref 8.5–10.5)
CHLORIDE SERPL-SCNC: 107 MMOL/L (ref 96–112)
CHOLEST SERPL-MCNC: 108 MG/DL (ref 100–199)
CO2 SERPL-SCNC: 26 MMOL/L (ref 20–33)
CREAT SERPL-MCNC: 0.81 MG/DL (ref 0.5–1.4)
ERYTHROCYTE [DISTWIDTH] IN BLOOD BY AUTOMATED COUNT: 37.3 FL (ref 35.9–50)
EST. AVERAGE GLUCOSE BLD GHB EST-MCNC: 111 MG/DL
GLUCOSE BLD-MCNC: 84 MG/DL (ref 65–99)
GLUCOSE BLD-MCNC: 94 MG/DL (ref 65–99)
GLUCOSE SERPL-MCNC: 89 MG/DL (ref 65–99)
HBA1C MFR BLD: 5.5 % (ref 0–5.6)
HCT VFR BLD AUTO: 40.6 % (ref 42–52)
HDLC SERPL-MCNC: 37 MG/DL
HGB BLD-MCNC: 14.4 G/DL (ref 14–18)
LDLC SERPL CALC-MCNC: 54 MG/DL
MCH RBC QN AUTO: 31.6 PG (ref 27–33)
MCHC RBC AUTO-ENTMCNC: 35.5 G/DL (ref 33.7–35.3)
MCV RBC AUTO: 89.2 FL (ref 81.4–97.8)
PLATELET # BLD AUTO: 202 K/UL (ref 164–446)
PMV BLD AUTO: 9 FL (ref 9–12.9)
POTASSIUM SERPL-SCNC: 3.9 MMOL/L (ref 3.6–5.5)
RBC # BLD AUTO: 4.55 M/UL (ref 4.7–6.1)
SODIUM SERPL-SCNC: 138 MMOL/L (ref 135–145)
TRIGL SERPL-MCNC: 86 MG/DL (ref 0–149)
WBC # BLD AUTO: 7.5 K/UL (ref 4.8–10.8)

## 2018-02-04 PROCEDURE — G0378 HOSPITAL OBSERVATION PER HR: HCPCS

## 2018-02-04 PROCEDURE — 36415 COLL VENOUS BLD VENIPUNCTURE: CPT

## 2018-02-04 PROCEDURE — 99406 BEHAV CHNG SMOKING 3-10 MIN: CPT | Performed by: INTERNAL MEDICINE

## 2018-02-04 PROCEDURE — 85027 COMPLETE CBC AUTOMATED: CPT

## 2018-02-04 PROCEDURE — 80048 BASIC METABOLIC PNL TOTAL CA: CPT

## 2018-02-04 PROCEDURE — 93880 EXTRACRANIAL BILAT STUDY: CPT

## 2018-02-04 PROCEDURE — A9502 TC99M TETROFOSMIN: HCPCS

## 2018-02-04 PROCEDURE — 700111 HCHG RX REV CODE 636 W/ 250 OVERRIDE (IP)

## 2018-02-04 PROCEDURE — 700102 HCHG RX REV CODE 250 W/ 637 OVERRIDE(OP): Performed by: INTERNAL MEDICINE

## 2018-02-04 PROCEDURE — 82962 GLUCOSE BLOOD TEST: CPT

## 2018-02-04 PROCEDURE — A9270 NON-COVERED ITEM OR SERVICE: HCPCS | Performed by: INTERNAL MEDICINE

## 2018-02-04 PROCEDURE — 99217 PR OBSERVATION CARE DISCHARGE: CPT | Mod: 25 | Performed by: INTERNAL MEDICINE

## 2018-02-04 PROCEDURE — 80061 LIPID PANEL: CPT

## 2018-02-04 RX ORDER — REGADENOSON 0.08 MG/ML
INJECTION, SOLUTION INTRAVENOUS
Status: COMPLETED
Start: 2018-02-04 | End: 2018-02-04

## 2018-02-04 RX ADMIN — ASPIRIN 325 MG: 325 TABLET, FILM COATED ORAL at 13:30

## 2018-02-04 RX ADMIN — BUSPIRONE HYDROCHLORIDE 10 MG: 10 TABLET ORAL at 13:45

## 2018-02-04 RX ADMIN — HYDROCODONE BITARTRATE AND ACETAMINOPHEN 1 TABLET: 5; 325 TABLET ORAL at 06:58

## 2018-02-04 RX ADMIN — HYDROCODONE BITARTRATE AND ACETAMINOPHEN 1 TABLET: 5; 325 TABLET ORAL at 13:58

## 2018-02-04 RX ADMIN — REGADENOSON 0.4 MG: 0.08 INJECTION, SOLUTION INTRAVENOUS at 12:55

## 2018-02-04 RX ADMIN — OMEPRAZOLE 20 MG: 20 CAPSULE, DELAYED RELEASE ORAL at 13:45

## 2018-02-04 RX ADMIN — LITHIUM CARBONATE 300 MG: 300 TABLET ORAL at 13:45

## 2018-02-04 ASSESSMENT — ENCOUNTER SYMPTOMS
SHORTNESS OF BREATH: 0
DIZZINESS: 1
MYALGIAS: 0
FOCAL WEAKNESS: 0
HEMOPTYSIS: 0
HEADACHES: 0
INSOMNIA: 0
WHEEZING: 0
FALLS: 0
ABDOMINAL PAIN: 0
EYE PAIN: 0
CONSTIPATION: 0
EYE REDNESS: 0
COUGH: 0
SEIZURES: 0
NAUSEA: 1
NERVOUS/ANXIOUS: 0
BLOOD IN STOOL: 0
PALPITATIONS: 0
FEVER: 0
VOMITING: 0
DIARRHEA: 0
CHILLS: 0
WEAKNESS: 0
LOSS OF CONSCIOUSNESS: 0
TREMORS: 0

## 2018-02-04 ASSESSMENT — PAIN SCALES - GENERAL
PAINLEVEL_OUTOF10: 8
PAINLEVEL_OUTOF10: 0
PAINLEVEL_OUTOF10: 0

## 2018-02-04 NOTE — PROGRESS NOTES
Nucmed here for pt however pt is unable to tolerate treadmill portion for stress echo.  Dr. Velázquez pagewilfredo for new order.

## 2018-02-04 NOTE — DISCHARGE SUMMARY
CHIEF COMPLAINT ON ADMISSION  Chief Complaint   Patient presents with   • Chest Pain     x1wk, now radiating up into neck this am, +tingling in left hand       CODE STATUS  Full Code    HPI & HOSPITAL COURSE  Please review Dr. Wilmer Velázquez M.D. notes for further details of history of present illness, past medical/social/family histories, allergies and medications.    * Chest pain   Assessment & Plan    Presents with chest pain radiating to left arm  Young, however has several risk factors  No history of drug dependence  Telemetry, trend troponins, stress test if troponins are negative, ASA, ordered lipid panel.  Low HDL on lipid panel; recommend exercise, dietary modifications  Carotid Duplex, no hemodynamically significanyt stenoses of the carotids  Stress test negative for ischemia  At discharge date he wants to go home. No chest pain. He is afebrile, hemodynamically stable. He ambulated and tolerated food. Wife at bedside.  Follow up with Zack Bhakta D.N.P. In 1-2 weeks. If still having chest pain, can consider referral to Cardiologyclinic for elective ischemic evaluation          BRAYDEN (obstructive sleep apnea)- (present on admission)   Assessment & Plan    Not compliant with CPAP  Follow up with BENY GayP. In 1-2 weeks and recommend repeat sleep study        Diabetes mellitus, type II (CMS-HCC)- (present on admission)   Assessment & Plan    Sliding scale insulin ordered. Obtain A1c  Blood glucoses here are lower side  Can consider doing diet control and discosntinuing insulin/oral hypoglycemics.        Tobacco abuse- (present on admission)   Assessment & Plan    I spent 5 minutes, discussing tobacco dependence and cardiac as well as pulmonary risk. Smoking cessation instructions.               Discharge Physical Exam  General/Constitutional: No acute distress.   Head: Normocephalic, atraumatic  ENT: Oral mucosa is moist. No obvious pharyngeal exudates  Eyes: Pink conjunctiva, no scleral  icterus  Neck: Supple, no lymphadenopathy  Cardiovascular: Normal rate and regular rhythm. S1,2 noted. No murmurs, gallops or rubs.  Pulmonary: Clear to auscultation bilaterally. No wheezes, rales or rhonchi.  Abdominal: Soft, nontender, not distended, bowel sounds normoactive. No guarding or peritoneal signs.  Musculoskeletal: No tenderness to palpation of chest wall.  Neurologic: Alert and oriented. Grossly nonfocal, moving all extremities.  Genitourinary: No gross hematuria  Skin: No obvious rash.  Psychiatric: Pleasant, cooperative.  Vitals Reviewed  Labs Reviewed  Imaging reviewed  Nursing notes reviewed      Therefore, he is discharged in good and stable condition with close outpatient follow-up.    SPECIFIC OUTPATIENT FOLLOW-UP  Follow up with Zack Bhakta D.N.P. In 1-2 weeks. If still having chest pain, can consider referral to Cardiologyclinic for elective ischemic evaluation    FOLLOW UP  No future appointments.  No follow-up provider specified.    MEDICATIONS ON DISCHARGE   Marty Pedrozay   Home Medication Instructions KENNETH:[id number]    Printed on:02/04/18 1422   Medication Information                      aspirin EC (ECOTRIN) 81 MG Tablet Delayed Response  Take 81 mg by mouth every day.             busPIRone (BUSPAR) 10 MG Tab  Take 10 mg by mouth every morning.             Dulaglutide (TRULICITY) 1.5 MG/0.5ML Solution Pen-injector  Inject 1.5 mg as instructed every Tuesday.             hydrocodone-acetaminophen (NORCO) 5-325 MG Tab per tablet  Take 1 Tab by mouth 2 times a day as needed (pain).             lithium (ESKALITH) 300 MG TABS  Take 300 mg by mouth every morning.             pantoprazole (PROTONIX) 40 MG TBEC  Take 40 mg by mouth every morning.             tizanidine (ZANAFLEX) 4 MG Tab  Take 4 mg by mouth every 6 hours as needed.                 DIET  Orders Placed This Encounter   Procedures   • DIET ORDER     Standing Status:   Standing     Number of Occurrences:   1     Order  Specific Question:   Diet:     Answer:   Diabetic [3]     Order Specific Question:   Diet:     Answer:   Cardiac [6]       ACTIVITY  As tolerated    CONSULTATIONS      PROCEDURES  Dx-chest-portable (1 View)    Result Date: 2/3/2018  2/3/2018 1:57 PM HISTORY/REASON FOR EXAM: Chest pain TECHNIQUE/EXAM DESCRIPTION AND NUMBER OF VIEWS: Single portable view of the chest. COMPARISON: None FINDINGS: There is no evidence of focal consolidation or evidence of pulmonary edema. There is no pleural effusion. The heart is normal in size.     No evidence of acute cardiopulmonary process.    Carotid Duplex    Result Date: 2018   Carotid Duplex  Report  Vascular Laboratory  CONCLUSIONS  Normal bilateral carotid exam.  Antegrade flow, bilateral vertebral arteries.  [facility]  Exam Date:     2018 08:45  Room #:     Inpatient  Priority:     Routine  Ht (in):             Wt (lb):  Ordering Physician:        MYLES JONES  Referring Physician:       [id number] Thomas  Sonographer:               Dhara Berger RVT  Study Type:                Complete Bilateral  Technical Quality:         Adequate  Age:    33    Gender:     M  MRN:    [id number]  :    1985      BSA:  Indications:     Bruit  CPT Codes:       21213  ICD Codes:       R09.89  History:         Bruit left neck.  Limitations:  Right Brachial BP:              /  Left Brachial BP:             /  RIGHT  Plaque          Plaque         Velocity (cm/s)  Characteristics Composition    Syst/Diast                                 60   / 27      Distal ICA                                 61   / 32      Mid ICA                                 67   / 25      Prox ICA                                 93   / 27      Distal CCA                                 98   / 25      Mid CCA                                 96   / 21      Prox CCA                                 55   / 10      ECA  Waveform:   Triphasic          164            SCA  LEFT   Plaque         Plaque          Velocity (cm/s)  CharacteristicsComposition     Syst/Diast                                 58   / 29      Distal ICA                                 75   / 32      Mid ICA                                 66   / 22      Prox ICA                                 116  / 32      Distal CCA                                 116  / 29      Mid CCA                                 129  / 28      Prox CCA                                 75   / 16      ECA  Waveform:   Triphasic          128            SCA          0.7          ICA/CCA       0.6        Antegrade      Vertebral   Antegrade         50   / 23     cm/s        40    / 18  FINDINGS  Bilateral:  Flow velocities and Doppler waveforms are normal throughout the carotid  system without evidence of plaque.  Bilateral subclavian and vertebral artery waveforms are antegrade and  normal in character and velocity.  Manoj Bunn  (Electronically Signed)  Final Date:      2018                   10:21    Nm-cardiac Stress Test    Result Date: 2018   Myocardial Perfusion  Report  NUCLEAR IMAGING INTERPRETATION  No evidence of significant jeopardized viable myocardium or prior myocardial  infarction.  Normal left ventricular size, ejection fraction, and wall motion.  [facility]  MRN:    [id number]         Gender:    M  Exam Date: 2018 11:50  Exam Location:      Inpatient  Ordering Phys:     MYLES JONES  NucMed Tech:       RT Bib (R,N)  Age:    33    :    1985        Ht (in):     71  Wt (lb):     294    BMI:    41.20       Radiologist  Risk Factors:             Family history of coronary disease, Smoking, Obesity,                            Diabetes  Indications:              Other chest pain  ICD Codes:                  Cardiac History:          Positive risk factors, Dyspnea  Cardiac Meds:  Meds Past 24 hrs:  Pretest Chest Pain:       No symptoms  STRESS TEST      Pharmacologi                    marcelo  Protoc   Lexiscan w/    Dose: 0.4 mg  ol:      Exer  Post-Injection Exercise:        An additional 1 minutes of exercise followed the                                  intravenous injection  Resting HR (bpm):      83  Peak HR (bpm):         121  Resting BP (mmHg):       0      /   0  Peak BP (mmHg):       155   /   73  MaxPHR:     186     Target HR (bpm):       158  % MaxPHR:     65  Double Product:       72830  BP Response:  Stress Termination:       Completed Protocol  Stress Symptoms:  Arrhythmia: None Chest Pain: None Flushing YES Headache NONE Resting EKG:   Normal Sinus Rhythm ST Changes:  None  ECG  Resting ECG:  Stress ECG:  IMAGE PROTOCOL      Rest/Stress 1                      Day          RadiopharmaceuticalDose (mCi)   Imaging  Date      Imaging  Time         Inj to Img Time (min)  Rest:   Tc-99m             7.8          04-Feb-2018        12:23          Tetrofosmin  Stress: Tc-99m             28.8         04-Feb-2018        13:19          Tetrofosmin  Rest:  Administration Site:       Right antecubital                             fossa  Administered by:      RT Bib (R,N)  Stress:  Administration Site:       Right antecubital                             fossa  Administered by:      Harpal Marie RN  % Percent HR Achieved:  SPECT RESULTS  Technical Quality:       Good  Raw Data Analysis:  Summed Stress Score:    2  Summed Rest Score:    0  Summed Difference Score:        9  PERFUSION:  Normal myocardial perfusion with no ischemia.  FUNCTIONAL RESULTS (calculated via Gated SPECT)  Stress Image LV EF:        55     %  Upper Normal Limit  Stress EDV:      141    ml   EDVI:    57      ml/m²  Stress ESV:      64     ml   ESVI:    26      ml/m²  TID:    1.33   TID - 1.19      TID (ed) - 1.23  LV Function:  Normal left ventricular wall motion.  LV ejection fraction = 55%.  Manoj Bunn  (Electronically Signed)  Final Date:      04 February 2018                    13:55      LABORATORY  Lab Results   Component Value Date/Time    SODIUM 138 02/04/2018 04:15 AM    POTASSIUM 3.9 02/04/2018 04:15 AM    CHLORIDE 107 02/04/2018 04:15 AM    CO2 26 02/04/2018 04:15 AM    GLUCOSE 89 02/04/2018 04:15 AM    BUN 15 02/04/2018 04:15 AM    CREATININE 0.81 02/04/2018 04:15 AM    CREATININE 0.9 11/04/2005 04:56 AM        Lab Results   Component Value Date/Time    WBC 7.5 02/04/2018 04:15 AM    HEMOGLOBIN 14.4 02/04/2018 04:15 AM    HEMATOCRIT 40.6 (L) 02/04/2018 04:15 AM    PLATELETCT 202 02/04/2018 04:15 AM        Total time of the discharge process exceeds 40 minutes

## 2018-02-04 NOTE — ASSESSMENT & PLAN NOTE
I spent 5 minutes, discussing tobacco dependence and cardiac as well as pulmonary risk. Smoking cessation instructions.

## 2018-02-04 NOTE — DISCHARGE INSTRUCTIONS
Discharge Instructions    Discharged to home by car with relative. Discharged via wheelchair, hospital escort: Yes.  Special equipment needed: Not Applicable    Be sure to schedule a follow-up appointment with your primary care doctor or any specialists as instructed.     Discharge Plan:   Diet Plan: Discussed (Regular)  Activity Level: Discussed (As tolerated)  Smoking Cessation Offered: Patient Counseled  Confirmed Follow up Appointment: Patient to Call and Schedule Appointment  Confirmed Symptoms Management: Discussed  Medication Reconciliation Updated: Yes  Influenza Vaccine Indication: Patient Refuses    I understand that a diet low in cholesterol, fat, and sodium is recommended for good health. Unless I have been given specific instructions below for another diet, I accept this instruction as my diet prescription.   Other diet: Regular    Special Instructions: None    · Is patient discharged on Warfarin / Coumadin?   No     Depression / Suicide Risk    As you are discharged from this [facility] facility, it is important to learn how to keep safe from harming yourself.    Recognize the warning signs:  · Abrupt changes in personality, positive or negative- including increase in energy   · Giving away possessions  · Change in eating patterns- significant weight changes-  positive or negative  · Change in sleeping patterns- unable to sleep or sleeping all the time   · Unwillingness or inability to communicate  · Depression  · Unusual sadness, discouragement and loneliness  · Talk of wanting to die  · Neglect of personal appearance   · Rebelliousness- reckless behavior  · Withdrawal from people/activities they love  · Confusion- inability to concentrate     If you or a loved one observes any of these behaviors or has concerns about self-harm, here's what you can do:  · Talk about it- your feelings and reasons for harming yourself  · Remove any means that you might use to hurt yourself (examples: pills, rope,  extension cords, firearm)  · Get professional help from the community (Mental Health, Substance Abuse, psychological counseling)  · Do not be alone:Call your Safe Contact- someone whom you trust who will be there for you.  · Call your local CRISIS HOTLINE 266-5868 or 161-646-2894  · Call your local Children's Mobile Crisis Response Team [place] (153) 259-9274 or www.[employer]  · Call the toll free National Suicide Prevention Hotlines   · National Suicide Prevention Lifeline 173-637-VZIS (1746)  · National Hope Line Network 800-SUICIDE (153-4073)

## 2018-02-04 NOTE — ASSESSMENT & PLAN NOTE
Not compliant with CPAP  Follow up with Zack Bhakta D.N.P. In 1-2 weeks and recommend repeat sleep study

## 2018-02-04 NOTE — H&P
Hospital Medicine History and Physical    Date of Service  2/4/2018    Chief Complaint  Chief Complaint   Patient presents with   • Chest Pain     x1wk, now radiating up into neck this am, +tingling in left hand       History of Presenting Illness  33 y.o. male who presented 2/3/2018 with chest pain  Described to be achy and sharp, radiating up to neck, not pleuritic, not reproducible by palpation, intermittent happening for a week now, regardless of rest or exertion, mild dizziness, some nausea, no sweating. He appears anxious as well. No cardiac history. He has been diagnosed with HTN and HPL before but currently not on meds. He has diabetes and smokes. He has BRAYDEN but has not used his CPAP. Family history of premature cardiac disease in the father.  At ED, afebrile, hemodynamically stable. EKG sinus. Troponin x 1 negative. CXR clear.   When I saw him at [facility], he is in no acute distress. Not having chest pain. Auscultation to me is clear. Wife at bedside.  Primary Care Physician  ZAIRA Gay.N.P.    Consultants      Code Status  full    Review of Systems  Review of Systems   Constitutional: Negative for chills and fever.   HENT: Negative for congestion, hearing loss and nosebleeds.    Eyes: Negative for pain and redness.   Respiratory: Negative for cough, hemoptysis, shortness of breath and wheezing.    Cardiovascular: Positive for chest pain. Negative for palpitations.   Gastrointestinal: Positive for nausea. Negative for abdominal pain, blood in stool, constipation, diarrhea and vomiting.   Genitourinary: Negative for dysuria, frequency and hematuria.   Musculoskeletal: Negative for falls, joint pain and myalgias.   Skin: Negative for rash.   Neurological: Positive for dizziness. Negative for tremors, focal weakness, seizures, loss of consciousness, weakness and headaches.   Psychiatric/Behavioral: The patient is not nervous/anxious and does not have insomnia.    All other systems reviewed and are  negative.       Past Medical History  Past Medical History:   Diagnosis Date   • GERD (gastroesophageal reflux disease) 7/21/2016   • Vitamin D deficiency 7/21/2016   • Prolapsed lumbosacral intervertebral disc 7/21/2016   • DJD (degenerative joint disease) 7/21/2016   • Fibromyalgia 7/21/2016   • Carpal tunnel syndrome 7/21/2016   • Peptic ulcer disease 7/21/2016   • Peripheral neuropathy (CMS-HCC) 7/21/2016   • Tobacco abuse 7/21/2016   • Chronic back pain 7/21/2016   • Nephrolithiasis 7/21/2016   • Diabetes mellitus, type II (CMS-HCC) 7/21/2016   • Pancreatitis 3/2014   • Anesthesia     wakes up violent   • Anxiety disorder    • Arthritis    • Bipolar 1 disorder    • Breath shortness     with exertion   • Cancer (CMS-HCC)     skin ca bridge of nose   • Chickenpox    • Chronic obstructive pulmonary disease (CMS-HCC)    • Degenerative disk disease    • Depression    • Enlarged heart    • Heart burn    • Hyperlipemia    • Hypertension    • Indigestion    • Influenza    • Kidney stone    • Obesity    • Pain     back and knee   • Pneumonia    • PTSD (post-traumatic stress disorder)    • Renal disorder     stones   • Rheumatoid arthritis (CMS-HCC)    • Skin cancer    • Sleep apnea     uses CPAP   • Snoring    • Type II or unspecified type diabetes mellitus without mention of complication, not stated as uncontrolled     oral meds   • Unspecified asthma(493.90)    • Unspecified hemorrhagic conditions     slow to stop bleeding when cut or with IV's       Surgical History  Past Surgical History:   Procedure Laterality Date   • CYSTOSCOPY STENT PLACEMENT  5/22/2014    Performed by Dagoberto Everett M.D. at SURGERY [facility]   • URETEROSCOPY  5/22/2014    Performed by Dagoberto Everett M.D. at SURGERY [facility]   • LASERTRIPSY  5/22/2014    Performed by Dagoberto Everett M.D. at SURGERY [facility]   • OTHER ORTHOPEDIC SURGERY  2002    realign left knee   • OTHER ORTHOPEDIC SURGERY  2001    left knee, lateral  release   • TONSILLECTOMY         Medications  No current facility-administered medications on file prior to encounter.      Current Outpatient Prescriptions on File Prior to Encounter   Medication Sig Dispense Refill   • Dulaglutide (TRULICITY) 1.5 MG/0.5ML Solution Pen-injector Inject 1.5 mg as instructed every Tuesday.     • hydrocodone-acetaminophen (NORCO) 5-325 MG Tab per tablet Take 1 Tab by mouth 2 times a day as needed (pain).     • tizanidine (ZANAFLEX) 4 MG Tab Take 4 mg by mouth every 6 hours as needed.     • aspirin EC (ECOTRIN) 81 MG Tablet Delayed Response Take 81 mg by mouth every day.     • pantoprazole (PROTONIX) 40 MG TBEC Take 40 mg by mouth every morning.     • lithium (ESKALITH) 300 MG TABS Take 300 mg by mouth every morning.         Family History  Family History   Problem Relation Age of Onset   • Depression Mother    • Anxiety disorder Mother    • Bipolar disorder Mother    • Seizures Mother    • Heart Attack Father 28   • Diabetes Father    • Hypertension Father        Social History  Social History   Substance Use Topics   • Smoking status: Current Every Day Smoker     Packs/day: 1.00     Years: 8.00     Types: Cigarettes   • Smokeless tobacco: Never Used   • Alcohol use No       Allergies  Allergies   Allergen Reactions   • Pertussis Vaccine Anaphylaxis     Childhood reaction   • Simvastatin Unspecified     Pancreatitis        Physical Exam  Laboratory   Hemodynamics  Temp (24hrs), Av.6 °C (97.9 °F), Min:36.4 °C (97.6 °F), Max:36.9 °C (98.5 °F)   Temperature: 36.9 °C (98.5 °F)  Pulse  Av.9  Min: 67  Max: 75 Heart Rate (Monitored): 70  Blood Pressure: 125/81, NIBP: 133/80      Respiratory      Respiration: 16, Pulse Oximetry: 97 %             Physical Exam   Constitutional: He appears well-developed and well-nourished.   HENT:   Head: Normocephalic and atraumatic.   Eyes: Conjunctivae and EOM are normal. No scleral icterus.   Neck: Normal range of motion. Neck supple.    Cardiovascular: Normal rate and regular rhythm.  Exam reveals no gallop and no friction rub.    No murmur heard.  Pulmonary/Chest: Effort normal and breath sounds normal. No respiratory distress. He has no wheezes. He has no rales.   Abdominal: Soft. Bowel sounds are normal. He exhibits no distension. There is no tenderness. There is no rebound and no guarding.   Musculoskeletal: He exhibits no edema or tenderness.   Neurological: He is alert.   Skin: Skin is warm.   Psychiatric: He has a normal mood and affect. His behavior is normal.       Recent Labs      02/03/18   1342  02/04/18   0415   WBC  7.3  7.5   RBC  4.83  4.55*   HEMOGLOBIN  15.1  14.4   HEMATOCRIT  42.9  40.6*   MCV  88.8  89.2   MCH  31.3  31.6   MCHC  35.2  35.5*   RDW  37.2  37.3   PLATELETCT  215  202   MPV  8.8*  9.0     Recent Labs      02/03/18   1342  02/04/18   0415   SODIUM  139  138   POTASSIUM  4.3  3.9   CHLORIDE  106  107   CO2  28  26   GLUCOSE  92  89   BUN  12  15   CREATININE  0.85  0.81   CALCIUM  9.0  8.8     Recent Labs      02/03/18   1342  02/04/18   0415   ALTSGPT  14   --    ASTSGOT  13   --    ALKPHOSPHAT  53   --    TBILIRUBIN  0.6   --    LIPASE  26   --    GLUCOSE  92  89             Recent Labs      02/04/18   0415   TRIGLYCERIDE  86   HDL  37*   LDL  54     Lab Results   Component Value Date    TROPONINI <0.01 02/03/2018     Urinalysis:    Lab Results  Component Value Date/Time   SPECGRAVITY 1.026 05/07/2014 1925   GLUCOSEUR Negative 05/07/2014 1925   KETONES Negative 05/07/2014 1925   NITRITE Negative 05/07/2014 1925   WBCURINE Rare (A) 05/07/2014 1925   RBCURINE  (A) 05/07/2014 1925        Imaging  Dx-chest-portable (1 View)    Result Date: 2/3/2018  2/3/2018 1:57 PM HISTORY/REASON FOR EXAM: Chest pain TECHNIQUE/EXAM DESCRIPTION AND NUMBER OF VIEWS: Single portable view of the chest. COMPARISON: None FINDINGS: There is no evidence of focal consolidation or evidence of pulmonary edema. There is no pleural  effusion. The heart is normal in size.     No evidence of acute cardiopulmonary process.     Assessment/Plan     I anticipate this patient is appropriate for observation status at this time.    * Chest pain   Assessment & Plan    Presents with chest pain radiating to left arm  Young, however has several risk factors  No history of drug dependence  Telemetry, trend troponins, stress test if troponins are negative, ASA, ordered lipid panel.          Diabetes mellitus, type II (CMS-HCC)- (present on admission)   Assessment & Plan    Sliding scale insulin ordered. Obtain A1c        Tobacco abuse- (present on admission)   Assessment & Plan    I spent 5 minutes, discussing tobacco dependence and cardiac as well as pulmonary risk. Smoking cessation instructions. Code 77436              VTE prophylaxis: pharmacologic.    I spent 71 minutes, reviewing the chart, notes, vitals, labs, imaging, ordering labs, evaluating Marty Pedroza for assessment, enacting the plan above. 50% of the time was spent in counseling Marty Pedroza and wife answering questions. Medical decision making is therefore complex. Time was devoted to counseling and coordinating care including review of records, pertinent lab data and studies, as well as discussing diagnostic evaluation and work up, planned therapeutic interventions and future disposition of care. Where indicated, the assessment and plan reflect discussion of patient with consultants, other healthcare providers, family members, and additional research needed to obtain further information in formulating the plan of care for Marty Pedroza.

## 2018-02-04 NOTE — PROGRESS NOTES
Report received, pt care assumed. Pt up to unit. Pt ambulate to bed, steady gait. Admit profile complete. Pt up to date on PNA vaccine and refuse flu vaccine. Pt c/o back pain due to sitting in gurney, prn Norco given to pt (see MAR). Pt denies CP at this time. Pt family at bedside. Pt and family updated on POC. Pt denies any additional needs at this time. Call light and personal belongings within reach. Will continue to monitor.

## 2018-02-04 NOTE — PROGRESS NOTES
Assessment per flow chart. Pt on tele monitor,NSR noted. Pt A&Ox4, denies pain, SOB.  Right A/C IV site- c,d,i.  Pt's SpO2-96% on R/A.  Discussed POC with pt, pt verbalized understanding and agreement.  Pt instructed to call for assistance, pt's belonging's and call light within reach. Will continue to monitor.

## 2018-02-04 NOTE — PROGRESS NOTES
Report received, assumed patient care.  Pt A&OX4.  Family at bedside.  Assessment completed.  Call light within reach, personal belongings available, bed in lowest position, pt calling for assistance.  Pt reports no chest pain.  Pt is NPO pending echo stress this afternoon.  Communication board updated, POC discussed.  Monitors reapplied, VSS.  No additional needs at this time.

## 2018-02-04 NOTE — PROGRESS NOTES
"Pt called wondering what time echo stress will be completed.  [facility] called, stress test has been pushed from 1100 to 1300.  Pt states, \"I'm leaving at 3pm no matter what.\"   Explained to pt if he does decide to leave without a d/c order insurance may not cover his stay.  Pt states, \"I want to speak with the doctor, Ihaven't seen him yet today.\"   Dr. Velázquez paged, waiting on return call.  "

## 2018-02-04 NOTE — ED NOTES
Assist RN note: Answered pt's call light, pt asking to be reattached to monitoring equipment. Meal provided. PT updated on POC.

## 2018-02-04 NOTE — PROGRESS NOTES
Pt discharged to home. IV d/c'd, pt armband removed. Pt and family understand written and verbal d/c instructions.  Prescriptions given.  Regular diet, activity as tolerated.  Pt to follow up with PCP.  Pt instructed not drive after taking any narcotics.  Pt verbalized understanding.

## 2018-02-05 ENCOUNTER — PATIENT OUTREACH (OUTPATIENT)
Dept: HEALTH INFORMATION MANAGEMENT | Facility: OTHER | Age: 33
End: 2018-02-05

## 2018-02-21 ENCOUNTER — HOSPITAL ENCOUNTER (OUTPATIENT)
Dept: RADIOLOGY | Facility: MEDICAL CENTER | Age: 33
End: 2018-02-21
Attending: NURSE PRACTITIONER
Payer: MEDICARE

## 2018-02-21 DIAGNOSIS — R91.8 LUNG MASS: ICD-10-CM

## 2018-02-21 PROCEDURE — 71250 CT THORAX DX C-: CPT

## 2018-08-02 ENCOUNTER — APPOINTMENT (OUTPATIENT)
Dept: RADIOLOGY | Facility: MEDICAL CENTER | Age: 33
End: 2018-08-02
Attending: EMERGENCY MEDICINE
Payer: MEDICARE

## 2018-08-02 ENCOUNTER — HOSPITAL ENCOUNTER (EMERGENCY)
Facility: MEDICAL CENTER | Age: 33
End: 2018-08-02
Attending: EMERGENCY MEDICINE
Payer: MEDICARE

## 2018-08-02 VITALS
WEIGHT: 290 LBS | RESPIRATION RATE: 17 BRPM | DIASTOLIC BLOOD PRESSURE: 89 MMHG | HEART RATE: 73 BPM | HEIGHT: 71 IN | TEMPERATURE: 97.5 F | SYSTOLIC BLOOD PRESSURE: 155 MMHG | BODY MASS INDEX: 40.6 KG/M2 | OXYGEN SATURATION: 93 %

## 2018-08-02 DIAGNOSIS — R40.4 TRANSIENT ALTERATION OF AWARENESS: ICD-10-CM

## 2018-08-02 DIAGNOSIS — R73.9 HYPERGLYCEMIA: ICD-10-CM

## 2018-08-02 DIAGNOSIS — M79.10 MYALGIA: ICD-10-CM

## 2018-08-02 LAB
ALBUMIN SERPL BCP-MCNC: 4.6 G/DL (ref 3.2–4.9)
ALBUMIN/GLOB SERPL: 2.3 G/DL
ALP SERPL-CCNC: 54 U/L (ref 30–99)
ALT SERPL-CCNC: 26 U/L (ref 2–50)
AMPHET UR QL SCN: NEGATIVE
ANION GAP SERPL CALC-SCNC: 10 MMOL/L (ref 0–11.9)
APPEARANCE UR: CLEAR
AST SERPL-CCNC: 18 U/L (ref 12–45)
BARBITURATES UR QL SCN: NEGATIVE
BASOPHILS # BLD AUTO: 1.1 % (ref 0–1.8)
BASOPHILS # BLD: 0.1 K/UL (ref 0–0.12)
BENZODIAZ UR QL SCN: NEGATIVE
BILIRUB SERPL-MCNC: 0.3 MG/DL (ref 0.1–1.5)
BILIRUB UR QL STRIP.AUTO: NEGATIVE
BUN SERPL-MCNC: 19 MG/DL (ref 8–22)
BZE UR QL SCN: NEGATIVE
CALCIUM SERPL-MCNC: 9.3 MG/DL (ref 8.5–10.5)
CANNABINOIDS UR QL SCN: POSITIVE
CHLORIDE SERPL-SCNC: 103 MMOL/L (ref 96–112)
CK SERPL-CCNC: 66 U/L (ref 0–154)
CO2 SERPL-SCNC: 23 MMOL/L (ref 20–33)
COLOR UR: YELLOW
CREAT SERPL-MCNC: 0.8 MG/DL (ref 0.5–1.4)
EOSINOPHIL # BLD AUTO: 0.59 K/UL (ref 0–0.51)
EOSINOPHIL NFR BLD: 6.4 % (ref 0–6.9)
ERYTHROCYTE [DISTWIDTH] IN BLOOD BY AUTOMATED COUNT: 35.2 FL (ref 35.9–50)
ETHANOL BLD-MCNC: 0 G/DL
GLOBULIN SER CALC-MCNC: 2 G/DL (ref 1.9–3.5)
GLUCOSE SERPL-MCNC: 172 MG/DL (ref 65–99)
GLUCOSE UR STRIP.AUTO-MCNC: NEGATIVE MG/DL
HCT VFR BLD AUTO: 43.8 % (ref 42–52)
HGB BLD-MCNC: 15.8 G/DL (ref 14–18)
IMM GRANULOCYTES # BLD AUTO: 0.03 K/UL (ref 0–0.11)
IMM GRANULOCYTES NFR BLD AUTO: 0.3 % (ref 0–0.9)
KETONES UR STRIP.AUTO-MCNC: NEGATIVE MG/DL
LEUKOCYTE ESTERASE UR QL STRIP.AUTO: NEGATIVE
LIPASE SERPL-CCNC: 30 U/L (ref 11–82)
LITHIUM SERPL-MCNC: 0.2 MMOL/L (ref 0.6–1.2)
LYMPHOCYTES # BLD AUTO: 2.88 K/UL (ref 1–4.8)
LYMPHOCYTES NFR BLD: 31 % (ref 22–41)
MCH RBC QN AUTO: 32.2 PG (ref 27–33)
MCHC RBC AUTO-ENTMCNC: 36.1 G/DL (ref 33.7–35.3)
MCV RBC AUTO: 89.2 FL (ref 81.4–97.8)
METHADONE UR QL SCN: NEGATIVE
MICRO URNS: NORMAL
MONOCYTES # BLD AUTO: 0.41 K/UL (ref 0–0.85)
MONOCYTES NFR BLD AUTO: 4.4 % (ref 0–13.4)
NEUTROPHILS # BLD AUTO: 5.28 K/UL (ref 1.82–7.42)
NEUTROPHILS NFR BLD: 56.8 % (ref 44–72)
NITRITE UR QL STRIP.AUTO: NEGATIVE
NRBC # BLD AUTO: 0 K/UL
NRBC BLD-RTO: 0 /100 WBC
OPIATES UR QL SCN: NEGATIVE
OXYCODONE UR QL SCN: NEGATIVE
PCP UR QL SCN: NEGATIVE
PH UR STRIP.AUTO: 6.5 [PH]
PLATELET # BLD AUTO: 227 K/UL (ref 164–446)
PMV BLD AUTO: 9.1 FL (ref 9–12.9)
POTASSIUM SERPL-SCNC: 3.9 MMOL/L (ref 3.6–5.5)
PROPOXYPH UR QL SCN: NEGATIVE
PROT SERPL-MCNC: 6.6 G/DL (ref 6–8.2)
PROT UR QL STRIP: NEGATIVE MG/DL
RBC # BLD AUTO: 4.91 M/UL (ref 4.7–6.1)
RBC UR QL AUTO: NEGATIVE
SODIUM SERPL-SCNC: 136 MMOL/L (ref 135–145)
SP GR UR STRIP.AUTO: 1.02
TROPONIN I SERPL-MCNC: <0.01 NG/ML (ref 0–0.04)
TROPONIN I SERPL-MCNC: <0.01 NG/ML (ref 0–0.04)
UROBILINOGEN UR STRIP.AUTO-MCNC: 0.2 MG/DL
WBC # BLD AUTO: 9.3 K/UL (ref 4.8–10.8)

## 2018-08-02 PROCEDURE — 80178 ASSAY OF LITHIUM: CPT

## 2018-08-02 PROCEDURE — 36415 COLL VENOUS BLD VENIPUNCTURE: CPT

## 2018-08-02 PROCEDURE — 93005 ELECTROCARDIOGRAM TRACING: CPT | Performed by: EMERGENCY MEDICINE

## 2018-08-02 PROCEDURE — 82550 ASSAY OF CK (CPK): CPT

## 2018-08-02 PROCEDURE — 80053 COMPREHEN METABOLIC PANEL: CPT

## 2018-08-02 PROCEDURE — 71046 X-RAY EXAM CHEST 2 VIEWS: CPT

## 2018-08-02 PROCEDURE — 85025 COMPLETE CBC W/AUTO DIFF WBC: CPT

## 2018-08-02 PROCEDURE — 81003 URINALYSIS AUTO W/O SCOPE: CPT | Mod: XU

## 2018-08-02 PROCEDURE — 99284 EMERGENCY DEPT VISIT MOD MDM: CPT

## 2018-08-02 PROCEDURE — A9270 NON-COVERED ITEM OR SERVICE: HCPCS | Performed by: EMERGENCY MEDICINE

## 2018-08-02 PROCEDURE — 70450 CT HEAD/BRAIN W/O DYE: CPT

## 2018-08-02 PROCEDURE — 700102 HCHG RX REV CODE 250 W/ 637 OVERRIDE(OP): Performed by: EMERGENCY MEDICINE

## 2018-08-02 PROCEDURE — 84484 ASSAY OF TROPONIN QUANT: CPT

## 2018-08-02 PROCEDURE — 80307 DRUG TEST PRSMV CHEM ANLYZR: CPT

## 2018-08-02 PROCEDURE — 94760 N-INVAS EAR/PLS OXIMETRY 1: CPT

## 2018-08-02 PROCEDURE — 83690 ASSAY OF LIPASE: CPT

## 2018-08-02 RX ORDER — LORATADINE 10 MG/1
10 TABLET ORAL ONCE
Status: DISCONTINUED | OUTPATIENT
Start: 2018-08-02 | End: 2018-08-02 | Stop reason: HOSPADM

## 2018-08-02 RX ORDER — ACETAMINOPHEN 325 MG/1
650 TABLET ORAL ONCE
Status: COMPLETED | OUTPATIENT
Start: 2018-08-02 | End: 2018-08-02

## 2018-08-02 RX ADMIN — ACETAMINOPHEN 650 MG: 325 TABLET, FILM COATED ORAL at 19:06

## 2018-08-02 NOTE — ED PROVIDER NOTES
"ED Provider Note    Scribed for Fabiola Streeter D.O. by Denys Tucker. 8/2/2018, 4:20 PM.    Primary care provider: Zack Bhakta D.N.P.  Means of arrival: Walk In  History obtained from: Patient  History limited by: None    CHIEF COMPLAINT  Chief Complaint   Patient presents with   • Other     episode last night where he c/o lips tingling, crosseyed, chest pain and confusion   • Body Aches     \"all over\"       HPI  Marty Pedroza is a 33 y.o. male who presents to the Emergency Department for evaluation of confusion and body aches onset 1 day ago. The patient's wife reports that last night, the patient suddenly began going cross eyed and his head started bobbing. The wife also notes that the patient was experiencing associated difficulty speaking where he was speaking sentences that sounded \"jumbled\" which the patient was not aware of for a couple of hours, however, he eventually became aware of the his difficulty speaking a few hours later in the night. Per wife, the patient was having chest pain while he was sleeping and she stated that he had severe limb pain that was eventually spread to full body pain by the time he woke up this morning. The wife endorses that the patient had severe left sided weakness and a cough last night as well. The patient adds that he took half of an Ativan yesterday at noon. The patient confirms that he does not remember last night well but that today he has been very confused, walking the wrong direction to his house and not being able to make sound decisions. Currently he is still experiencing the full body pain. He states that yesterday was quite stressful for him. He denies recent fevers, vomiting, diarrhea, abdominal pain, numbness, and tingling. He regularly takes Lithium for bipolar disorder.       REVIEW OF SYSTEMS  See HPI for further details. All other systems are negative.   C.     PAST MEDICAL HISTORY   has a past medical history of Anesthesia; Anxiety disorder; " Arthritis; Bipolar 1 disorder; Breath shortness; Cancer (Formerly [facility]); Carpal tunnel syndrome (7/21/2016); Chickenpox; Chronic back pain (7/21/2016); Chronic obstructive pulmonary disease ([facility]); Degenerative disk disease; Depression; Diabetes mellitus, type II ([facility]) (7/21/2016); DJD (degenerative joint disease) (7/21/2016); Enlarged heart; Fibromyalgia (7/21/2016); GERD (gastroesophageal reflux disease) (7/21/2016); Heart burn; Hyperlipemia; Hypertension; Indigestion; Influenza; Kidney stone; Nephrolithiasis (7/21/2016); Obesity; Pain; Pancreatitis (3/2014); Peptic ulcer disease (7/21/2016); Peripheral neuropathy ([facility]) (7/21/2016); Pneumonia; Prolapsed lumbosacral intervertebral disc (7/21/2016); PTSD (post-traumatic stress disorder); Renal disorder; Rheumatoid arthritis ([facility]); Skin cancer; Sleep apnea; Snoring; Tobacco abuse (7/21/2016); Type II or unspecified type diabetes mellitus without mention of complication, not stated as uncontrolled; Unspecified asthma(493.90); Unspecified hemorrhagic conditions; and Vitamin D deficiency (7/21/2016).    SURGICAL HISTORY   has a past surgical history that includes tonsillectomy; other orthopedic surgery (2001); other orthopedic surgery (2002); cystoscopy stent placement (5/22/2014); ureteroscopy (5/22/2014); and lasertripsy (5/22/2014).    SOCIAL HISTORY  Social History   Substance Use Topics   • Smoking status: Current Every Day Smoker     Packs/day: 1.00     Years: 8.00     Types: Cigarettes   • Smokeless tobacco: Never Used   • Alcohol use No      History   Drug Use No       FAMILY HISTORY  Family History   Problem Relation Age of Onset   • Depression Mother    • Anxiety disorder Mother    • Bipolar disorder Mother    • Seizures Mother    • Heart Attack Father 28   • Diabetes Father    • Hypertension Father        CURRENT MEDICATIONS  Reviewed.  See Encounter Summary.     ALLERGIES  Allergies   Allergen Reactions   • Pertussis Vaccine Anaphylaxis     Childhood reaction   •  "Simvastatin Unspecified     Pancreatitis       PHYSICAL EXAM  VITAL SIGNS: /89   Pulse 90   Temp 36.4 °C (97.5 °F)   Resp 17   Ht 1.803 m (5' 11\") Comment: Simultaneous filing. User may not have seen previous data.  SpO2 96%   Constitutional: Alert and in no apparent distress. Obese male.  HENT: Normocephalic atraumatic. Bilateral external ears normal. Nose normal. Mucous membranes are moist.  Eyes: Pupils are equal and reactive. Conjunctiva normal. Non-icteric sclera.   Neck: Normal range of motion without tenderness. Supple. No meningeal signs.  Cardiovascular: Regular rate and rhythm. No murmurs, gallops or rubs.  Thorax & Lungs: Breath sounds are clear to auscultation bilaterally. No wheezing, rhonchi or rales.  Abdomen: Soft, nontender and nondistended. No peritoneal signs noted.  Skin: Warm and dry. No rashes are noted.  Back: No bony tenderness, No CVA tenderness.   Extremities: 2+ peripheral pulses. Cap refill is less than 2 seconds. No edema, cyanosis, or clubbing.  Musculoskeletal: Good range of motion in all major joints. No tenderness to palpation or major deformities noted.   Neurologic: Alert and oriented ×3. Cranial nerves II through XII grossly intact. Normal finger-to-nose. No pronator drift. 5 out of 5 muscle strength bilateral upper and lower extremities. Decreased sensation in bilateral lower extremities equally (patient states this is his baseline secondary to his peripheral neuropathy). Normal gait, normal heel to toe.   Psychiatric: Affect is flat. Judgment appears to be intact.    DIAGNOSTIC STUDIES / PROCEDURES     LABS  Results for orders placed or performed during the hospital encounter of 08/02/18   LITHIUM   Result Value Ref Range    Lithium 0.2 (L) 0.6 - 1.2 mmol/L   CREATINE KINASE   Result Value Ref Range    CPK Total 66 0 - 154 U/L   CBC WITH DIFFERENTIAL   Result Value Ref Range    WBC 9.3 4.8 - 10.8 K/uL    RBC 4.91 4.70 - 6.10 M/uL    Hemoglobin 15.8 14.0 - 18.0 g/dL    " Hematocrit 43.8 42.0 - 52.0 %    MCV 89.2 81.4 - 97.8 fL    MCH 32.2 27.0 - 33.0 pg    MCHC 36.1 (H) 33.7 - 35.3 g/dL    RDW 35.2 (L) 35.9 - 50.0 fL    Platelet Count 227 164 - 446 K/uL    MPV 9.1 9.0 - 12.9 fL    Neutrophils-Polys 56.80 44.00 - 72.00 %    Lymphocytes 31.00 22.00 - 41.00 %    Monocytes 4.40 0.00 - 13.40 %    Eosinophils 6.40 0.00 - 6.90 %    Basophils 1.10 0.00 - 1.80 %    Immature Granulocytes 0.30 0.00 - 0.90 %    Nucleated RBC 0.00 /100 WBC    Neutrophils (Absolute) 5.28 1.82 - 7.42 K/uL    Lymphs (Absolute) 2.88 1.00 - 4.80 K/uL    Monos (Absolute) 0.41 0.00 - 0.85 K/uL    Eos (Absolute) 0.59 (H) 0.00 - 0.51 K/uL    Baso (Absolute) 0.10 0.00 - 0.12 K/uL    Immature Granulocytes (abs) 0.03 0.00 - 0.11 K/uL    NRBC (Absolute) 0.00 K/uL   COMP METABOLIC PANEL   Result Value Ref Range    Sodium 136 135 - 145 mmol/L    Potassium 3.9 3.6 - 5.5 mmol/L    Chloride 103 96 - 112 mmol/L    Co2 23 20 - 33 mmol/L    Anion Gap 10.0 0.0 - 11.9    Glucose 172 (H) 65 - 99 mg/dL    Bun 19 8 - 22 mg/dL    Creatinine 0.80 0.50 - 1.40 mg/dL    Calcium 9.3 8.5 - 10.5 mg/dL    AST(SGOT) 18 12 - 45 U/L    ALT(SGPT) 26 2 - 50 U/L    Alkaline Phosphatase 54 30 - 99 U/L    Total Bilirubin 0.3 0.1 - 1.5 mg/dL    Albumin 4.6 3.2 - 4.9 g/dL    Total Protein 6.6 6.0 - 8.2 g/dL    Globulin 2.0 1.9 - 3.5 g/dL    A-G Ratio 2.3 g/dL   LIPASE   Result Value Ref Range    Lipase 30 11 - 82 U/L   TROPONIN   Result Value Ref Range    Troponin I <0.01 0.00 - 0.04 ng/mL   URINALYSIS CULTURE, IF INDICATED   Result Value Ref Range    Color Yellow     Character Clear     Specific Gravity 1.024 <1.035    Ph 6.5 5.0 - 8.0    Glucose Negative Negative mg/dL    Ketones Negative Negative mg/dL    Protein Negative Negative mg/dL    Bilirubin Negative Negative    Urobilinogen, Urine 0.2 Negative    Nitrite Negative Negative    Leukocyte Esterase Negative Negative    Occult Blood Negative Negative    Micro Urine Req see below    URINE DRUG SCREEN  (TRIAGE)   Result Value Ref Range    Amphetamines Urine Negative Negative    Barbiturates Negative Negative    Benzodiazepines Negative Negative    Cocaine Metabolite Negative Negative    Methadone Negative Negative    Opiates Negative Negative    Oxycodone Negative Negative    Phencyclidine -Pcp Negative Negative    Propoxyphene Negative Negative    Cannabinoid Metab Positive (A) Negative   Blood Alcohol   Result Value Ref Range    Diagnostic Alcohol 0.00 0.00 g/dL   ESTIMATED GFR   Result Value Ref Range    GFR If African American >60 >60 mL/min/1.73 m 2    GFR If Non African American >60 >60 mL/min/1.73 m 2   TROPONIN   Result Value Ref Range    Troponin I <0.01 0.00 - 0.04 ng/mL   EKG (NOW)   Result Value Ref Range    Report       [facility] Emergency Dept.    Test Date:  2018  Pt Name:    KAISER SOTOMAYOR                  Department: ER  MRN:        [id number]                      Room:       [facility]  Gender:     Male                         Technician: [id number]  :        1985                   Requested By:IVET ROTH  Order #:    [id number]                    Reading MD:    Measurements  Intervals                                Axis  Rate:       78                           P:          9  TN:         160                          QRS:        -18  QRSD:       102                          T:          24  QT:         364  QTc:        415    Interpretive Statements  SINUS RHYTHM  BORDERLINE LEFT AXIS DEVIATION  Compared to ECG 2018 11:33:03  No significant changes         All labs were reviewed by me.    EKG  EKG was performed at 1700 shows normal sinus rhythm with heart rate of 78. TN interval is 160. QT/QTc are 364/415. Borderline left axis deviation but otherwise normal. No acute ST elevations or depressions are noted. Impression: Compared to previous EKG from February 3, 2018 with no acute changes. Otherwise a normal EKG.     RADIOLOGY  CT-HEAD W/O   Final Result      No acute  intracranial abnormality is identified.      Paranasal sinus disease.      DX-CHEST-2 VIEWS   Final Result      No acute cardiopulmonary process is identified.        The radiologist's interpretation of all radiological studies have been reviewed by me.    COURSE & MEDICAL DECISION MAKING  Pertinent Labs & Imaging studies reviewed. (See chart for details)    4:20 PM - Patient seen and examined at bedside. Ordered Dx-chest, CT-head, Urine Drug Screen, Blood Alcohol, Lithium, Creatine Kinase, CBC, CMP, Lipase, Troponin, UA, and EKG to evaluate his symptoms. The patient was informed on the plan of care including the variety of tests and he and his wife understood and agreed.     5:06 PM Patient was reevaluated at bedside. He was successfully able to ambulate around his room without assistance. At this time he noted that he is feeling much better than he did this morning.     Decision Making:  This is a 33 y.o. year old male who presents with a history of altered mental status and chest pain. On initial evaluation, the patient appeared quite comfortable and in no acute distress. He was laying in the gurney with no evidence of distress. His physical exam was also reassuring with a normal neurologic exam. I did consider intracranial mass/hemorrhage and a CT of the head was performed was only notable for mild paranasal sinus disease. No intracranial hemorrhage or mass were noted. Additionally, I have low clinical suspicion for stroke or TIA given his atypical constellation of symptoms and complete resolution of them currently. Given his history of chest pain last night, troponins ×2, a chest x-ray, and EKG were performed. These were within normal limits. I reviewed his stress test from February which was normal. He denied any chest pain, shortness of breath, or other anginal complaints at this time. I have low clinical suspicion for ACS currently. He does not have any risk factors for PE. Toxicology screens were also  ordered and were notable only for marijuana. A lithium level was checked and was subtherapeutic at 0.2. He do not believe this is a cause of his symptoms currently. His glucose was elevated at 172 but he did not have any other evidence of DKA on his lab workup. H&H were within normal limits and he has no evidence of hemorrhage. His CPK and kidney function were normal and I have low clinical suspicion for rhabdomyolysis. His white count was normal and he had no focus of infection on exam. He did complain of a headache and was treated with Tylenol which improved his pain. His sinusitis is noted on CT may be contributing to this. I did also treat him with loratadine. Upon further questioning, he did state that he had been significantly stressed out yesterday, and I believe that this may be contributing to his symptoms. When I reevaluated him prior to discharge, he appeared quite comfortable and stated that he felt significantly better. Patient was discharged home and will follow up with his primary care physician tomorrow or Monday. He'll return to the ED if any worsening signs or symptoms.     The patient will return for new or worsening symptoms and is stable at the time of discharge.    DISPOSITION:  Patient will be discharged home in stable condition.    FOLLOW UP:  BENY GayP.  [address] [id number] [phone]    Schedule an appointment as soon as possible for a visit in 1 day      [facility], Emergency Dept  [address] [id number] [phone]    If symptoms worsen      OUTPATIENT MEDICATIONS:  Discharge Medication List as of 8/2/2018  9:01 PM            FINAL IMPRESSION  1. Transient alteration of awareness    2. Myalgia    3. Hyperglycemia          Denys LAZCANO (Naila), am scribing for, and in the presence of, Fabiola Streeter D.O..    Electronically signed by: Denys Tucker (Naila), 8/2/2018    Fabiola LAZCANO,  BRITTON personally performed the services described in this documentation, as scribed by Denys Tucker in my presence, and it is both accurate and complete.    The note accurately reflects work and decisions made by me.  Fabiola Streeter  8/3/2018  12:58 AM

## 2018-08-03 ENCOUNTER — PATIENT OUTREACH (OUTPATIENT)
Dept: HEALTH INFORMATION MANAGEMENT | Facility: OTHER | Age: 33
End: 2018-08-03

## 2018-08-03 LAB — EKG IMPRESSION: NORMAL

## 2018-08-03 NOTE — DISCHARGE INSTRUCTIONS
Musculoskeletal Pain  Musculoskeletal pain is muscle and bone aches and pains. This pain can occur in any part of the body.  Follow these instructions at home:  · Only take medicines for pain, discomfort, or fever as told by your health care provider.  · You may continue all activities unless the activities cause more pain. When the pain lessens, slowly resume normal activities. Gradually increase the intensity and duration of the activities or exercise.  · During periods of severe pain, bed rest may be helpful. Lie or sit in any position that is comfortable, but get out of bed and walk around at least every several hours.  · If directed, put ice on the injured area.  ¨ Put ice in a plastic bag.  ¨ Place a towel between your skin and the bag.  ¨ Leave the ice on for 20 minutes, 2-3 times a day.  Contact a health care provider if:  · Your pain is getting worse.  · Your pain is not relieved with medicines.  · You lose function in the area of the pain if the pain is in your arms, legs, or neck.  This information is not intended to replace advice given to you by your health care provider. Make sure you discuss any questions you have with your health care provider.  Document Released: 12/18/2006 Document Revised: 05/30/2017 Document Reviewed: 08/22/2014  [employer] Interactive Patient Education © 2017 [employer]    Emotional Crisis  Part of your problem today may be due to an emotional crisis. Emotional states can cause many different physical signs and symptoms. These may include:  · Chest or stomach pain.  · Fluttering heartbeat.  · Passing out.  · Breathing difficulty.  · Headaches.  · Trembling.  · Hot or cold flashes.  · Numbness.  · Dizziness.  · Unusual muscle pain or fatigue.  · Insomnia.  When you have other medical problems, they are often made worse by emotional upsets.  Emotional crises can increase your stress and anxiety. Finding ways to reduce your stress level can make you feel better. You will become  more capable of dealing with these emotional states. Regular physical exercise such as walking can be very beneficial. Counseling or medicine to treat anxiety or depression may also be needed. See your caregiver if you have further problems or questions about your condition.  Document Released: 12/18/2006 Document Revised: 03/11/2013 Document Reviewed: 06/03/2008  [employer]® Patient Information ©2014 [employer].    Altered Mental Status  Altered mental status most often refers to an abnormal change in your responsiveness and awareness. It can affect your speech, thought, mobility, memory, attention span, or alertness. It can range from slight confusion to complete unresponsiveness (coma). Altered mental status can be a sign of a serious underlying medical condition. Rapid evaluation and medical treatment is necessary for patients having an altered mental status.  CAUSES   · Low blood sugar (hypoglycemia) or diabetes.  · Severe loss of body fluids (dehydration) or a body salt (electrolyte) imbalance.  · A stroke or other neurologic problem, such as dementia or delirium.  · A head injury or tumor.  · A drug or alcohol overdose.  · Exposure to toxins or poisons.  · Depression, anxiety, and stress.  · A low oxygen level (hypoxia).  · An infection.  · Blood loss.  · Twitching or shaking (seizure).  · Heart problems, such as heart attack or heart rhythm problems (arrhythmias).  · A body temperature that is too low or too high (hypothermia or hyperthermia).  DIAGNOSIS   A diagnosis is based on your history, symptoms, physical and neurologic examinations, and diagnostic tests. Diagnostic tests may include:  · Measurement of your blood pressure, pulse, breathing, and oxygen levels (vital signs).  · Blood tests.  · Urine tests.  · X-ray exams.  · A computerized magnetic scan (magnetic resonance imaging, MRI).  · A computerized X-ray scan (computed tomography, CT scan).  TREATMENT   Treatment will depend on the cause.  Treatment may include:  · Management of an underlying medical or mental health condition.  · Critical care or support in the hospital.  HOME CARE INSTRUCTIONS   · Only take over-the-counter or prescription medicines for pain, discomfort, or fever as directed by your caregiver.  · Manage underlying conditions as directed by your caregiver.  · Eat a healthy, well-balanced diet to maintain strength.  · Join a support group or prevention program to cope with the condition or trauma that caused the altered mental status. Ask your caregiver to help choose a program that works for you.  · Follow up with your caregiver for further examination, therapy, or testing as directed.  SEEK MEDICAL CARE IF:   · You feel unwell or have chills.  · You or your family notice a change in your behavior or your alertness.  · You have trouble following your caregiver's treatment plan.  · You have questions or concerns.  SEEK IMMEDIATE MEDICAL CARE IF:   · You have a rapid heartbeat or have chest pain.  · You have difficulty breathing.  · You have a fever.  · You have a headache with a stiff neck.  · You cough up blood.  · You have blood in your urine or stool.  · You have severe agitation or confusion.  MAKE SURE YOU:   · Understand these instructions.  · Will watch your condition.  · Will get help right away if you are not doing well or get worse.     This information is not intended to replace advice given to you by your health care provider. Make sure you discuss any questions you have with your health care provider.     Document Released: 06/07/2011 Document Revised: 03/11/2013 Document Reviewed: 02/11/2016  [employer] Interactive Patient Education ©2016 [employer]

## 2018-08-03 NOTE — ED NOTES
ERP aware pt had a headache. Pt becoming agitated. Ear plugs provided because he is located near a door that continues to open and is annoying him.

## 2018-08-03 NOTE — ED NOTES
"Presents to the ED with c/o chunk of time missing from memory. Endorses having a stressful day yesterday and cannot remember much past lunch time yesterday. Per spouse, pt went to [email or web address] to get food and tried to walk home in the wrong direction for close to an hour. Possible left sided weakness yesterday, but today strength WNL. Facial symmetry WNL, strength WNL, answering questions WNL. Able to walk with slow and steady gait. A&Ox4 with the exception of what happened yesterday. Today woke up \"feeling like I got hit by a truck.\"  "

## 2019-02-11 ENCOUNTER — HOSPITAL ENCOUNTER (OUTPATIENT)
Dept: RADIOLOGY | Facility: MEDICAL CENTER | Age: 34
End: 2019-02-11
Attending: NURSE PRACTITIONER
Payer: MEDICARE

## 2019-02-11 DIAGNOSIS — R91.8 ABNORMAL CT SCAN OF LUNG: ICD-10-CM

## 2019-02-11 PROCEDURE — 71250 CT THORAX DX C-: CPT

## 2019-02-21 ENCOUNTER — APPOINTMENT (OUTPATIENT)
Dept: RADIOLOGY | Facility: IMAGING CENTER | Age: 34
End: 2019-02-21
Attending: NURSE PRACTITIONER
Payer: MEDICARE

## 2019-02-21 ENCOUNTER — OFFICE VISIT (OUTPATIENT)
Dept: URGENT CARE | Facility: CLINIC | Age: 34
End: 2019-02-21
Payer: MEDICARE

## 2019-02-21 VITALS
WEIGHT: 290 LBS | HEIGHT: 71 IN | DIASTOLIC BLOOD PRESSURE: 88 MMHG | TEMPERATURE: 99.2 F | BODY MASS INDEX: 40.6 KG/M2 | HEART RATE: 73 BPM | RESPIRATION RATE: 16 BRPM | OXYGEN SATURATION: 93 % | SYSTOLIC BLOOD PRESSURE: 130 MMHG

## 2019-02-21 DIAGNOSIS — S60.211A CONTUSION OF RIGHT WRIST, INITIAL ENCOUNTER: ICD-10-CM

## 2019-02-21 DIAGNOSIS — S51.859A HUMAN BITE OF FOREARM, INITIAL ENCOUNTER: ICD-10-CM
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
## 2019-02-21 DIAGNOSIS — M25.531 ACUTE PAIN OF RIGHT WRIST: ICD-10-CM

## 2019-02-21 PROCEDURE — 73110 X-RAY EXAM OF WRIST: CPT | Mod: TC,RT | Performed by: NURSE PRACTITIONER

## 2019-02-21 PROCEDURE — 99203 OFFICE O/P NEW LOW 30 MIN: CPT | Performed by: NURSE PRACTITIONER

## 2019-02-21 RX ORDER — AMOXICILLIN AND CLAVULANATE POTASSIUM 875; 125 MG/1; MG/1
1 TABLET, FILM COATED ORAL 2 TIMES DAILY
Qty: 14 TAB | Refills: 0 | Status: SHIPPED | OUTPATIENT
Start: 2019-02-21 | End: 2019-02-28

## 2019-02-21 ASSESSMENT — ENCOUNTER SYMPTOMS
SENSORY CHANGE: 0
BRUISES/BLEEDS EASILY: 0
WEAKNESS: 0
CHILLS: 0
FEVER: 0
TINGLING: 0
MYALGIAS: 1
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
## 2019-08-16 ENCOUNTER — APPOINTMENT (OUTPATIENT)
Dept: RADIOLOGY | Facility: MEDICAL CENTER | Age: 34
End: 2019-08-16
Attending: EMERGENCY MEDICINE
Payer: MEDICARE

## 2019-08-16 ENCOUNTER — HOSPITAL ENCOUNTER (EMERGENCY)
Facility: MEDICAL CENTER | Age: 34
End: 2019-08-16
Attending: EMERGENCY MEDICINE
Payer: MEDICARE

## 2019-08-16 VITALS
SYSTOLIC BLOOD PRESSURE: 102 MMHG | HEART RATE: 61 BPM | OXYGEN SATURATION: 93 % | RESPIRATION RATE: 14 BRPM | DIASTOLIC BLOOD PRESSURE: 62 MMHG | HEIGHT: 71 IN | BODY MASS INDEX: 44.1 KG/M2 | WEIGHT: 315 LBS | TEMPERATURE: 97 F

## 2019-08-16 DIAGNOSIS — M54.50 ACUTE RIGHT-SIDED LOW BACK PAIN WITHOUT SCIATICA: ICD-10-CM

## 2019-08-16 LAB
ALBUMIN SERPL BCP-MCNC: 4.4 G/DL (ref 3.2–4.9)
ALBUMIN/GLOB SERPL: 1.6 G/DL
ALP SERPL-CCNC: 66 U/L (ref 30–99)
ALT SERPL-CCNC: 27 U/L (ref 2–50)
ANION GAP SERPL CALC-SCNC: 9 MMOL/L (ref 0–11.9)
APPEARANCE UR: CLEAR
AST SERPL-CCNC: 16 U/L (ref 12–45)
BASOPHILS # BLD AUTO: 1.4 % (ref 0–1.8)
BASOPHILS # BLD: 0.12 K/UL (ref 0–0.12)
BILIRUB SERPL-MCNC: 0.4 MG/DL (ref 0.1–1.5)
BILIRUB UR QL STRIP.AUTO: NEGATIVE
BUN SERPL-MCNC: 14 MG/DL (ref 8–22)
CALCIUM SERPL-MCNC: 9.5 MG/DL (ref 8.5–10.5)
CHLORIDE SERPL-SCNC: 102 MMOL/L (ref 96–112)
CO2 SERPL-SCNC: 26 MMOL/L (ref 20–33)
COLOR UR: ABNORMAL
CREAT SERPL-MCNC: 0.86 MG/DL (ref 0.5–1.4)
EOSINOPHIL # BLD AUTO: 0.89 K/UL (ref 0–0.51)
EOSINOPHIL NFR BLD: 10.6 % (ref 0–6.9)
ERYTHROCYTE [DISTWIDTH] IN BLOOD BY AUTOMATED COUNT: 35.8 FL (ref 35.9–50)
GLOBULIN SER CALC-MCNC: 2.8 G/DL (ref 1.9–3.5)
GLUCOSE SERPL-MCNC: 258 MG/DL (ref 65–99)
GLUCOSE UR STRIP.AUTO-MCNC: >=1000 MG/DL
HCT VFR BLD AUTO: 43 % (ref 42–52)
HGB BLD-MCNC: 15.3 G/DL (ref 14–18)
IMM GRANULOCYTES # BLD AUTO: 0.03 K/UL (ref 0–0.11)
IMM GRANULOCYTES NFR BLD AUTO: 0.4 % (ref 0–0.9)
KETONES UR STRIP.AUTO-MCNC: ABNORMAL MG/DL
LEUKOCYTE ESTERASE UR QL STRIP.AUTO: NEGATIVE
LITHIUM SERPL-MCNC: 0.7 MMOL/L (ref 0.6–1.2)
LYMPHOCYTES # BLD AUTO: 2.53 K/UL (ref 1–4.8)
LYMPHOCYTES NFR BLD: 30 % (ref 22–41)
MCH RBC QN AUTO: 31.7 PG (ref 27–33)
MCHC RBC AUTO-ENTMCNC: 35.6 G/DL (ref 33.7–35.3)
MCV RBC AUTO: 89 FL (ref 81.4–97.8)
MICRO URNS: ABNORMAL
MONOCYTES # BLD AUTO: 0.46 K/UL (ref 0–0.85)
MONOCYTES NFR BLD AUTO: 5.5 % (ref 0–13.4)
NEUTROPHILS # BLD AUTO: 4.4 K/UL (ref 1.82–7.42)
NEUTROPHILS NFR BLD: 52.1 % (ref 44–72)
NITRITE UR QL STRIP.AUTO: NEGATIVE
NRBC # BLD AUTO: 0 K/UL
NRBC BLD-RTO: 0 /100 WBC
PH UR STRIP.AUTO: 5.5 [PH] (ref 5–8)
PLATELET # BLD AUTO: 210 K/UL (ref 164–446)
PMV BLD AUTO: 9.2 FL (ref 9–12.9)
POTASSIUM SERPL-SCNC: 4 MMOL/L (ref 3.6–5.5)
PROT SERPL-MCNC: 7.2 G/DL (ref 6–8.2)
PROT UR QL STRIP: NEGATIVE MG/DL
RBC # BLD AUTO: 4.83 M/UL (ref 4.7–6.1)
RBC UR QL AUTO: NEGATIVE
SODIUM SERPL-SCNC: 137 MMOL/L (ref 135–145)
SP GR UR STRIP.AUTO: 1.04
UROBILINOGEN UR STRIP.AUTO-MCNC: 1 MG/DL
WBC # BLD AUTO: 8.4 K/UL (ref 4.8–10.8)

## 2019-08-16 PROCEDURE — 80178 ASSAY OF LITHIUM: CPT

## 2019-08-16 PROCEDURE — 85025 COMPLETE CBC W/AUTO DIFF WBC: CPT

## 2019-08-16 PROCEDURE — 99284 EMERGENCY DEPT VISIT MOD MDM: CPT

## 2019-08-16 PROCEDURE — 74176 CT ABD & PELVIS W/O CONTRAST: CPT

## 2019-08-16 PROCEDURE — 700111 HCHG RX REV CODE 636 W/ 250 OVERRIDE (IP): Performed by: EMERGENCY MEDICINE

## 2019-08-16 PROCEDURE — 96372 THER/PROPH/DIAG INJ SC/IM: CPT

## 2019-08-16 PROCEDURE — 80053 COMPREHEN METABOLIC PANEL: CPT

## 2019-08-16 PROCEDURE — 81003 URINALYSIS AUTO W/O SCOPE: CPT

## 2019-08-16 RX ORDER — HYDROMORPHONE HYDROCHLORIDE 1 MG/ML
1 INJECTION, SOLUTION INTRAMUSCULAR; INTRAVENOUS; SUBCUTANEOUS ONCE
Status: COMPLETED | OUTPATIENT
Start: 2019-08-16 | End: 2019-08-16

## 2019-08-16 RX ORDER — METHYLPREDNISOLONE 4 MG/1
TABLET ORAL
Qty: 1 KIT | Refills: 0 | Status: SHIPPED | OUTPATIENT
Start: 2019-08-16

## 2019-08-16 RX ORDER — HYDROCODONE BITARTRATE AND ACETAMINOPHEN 5; 325 MG/1; MG/1
1 TABLET ORAL EVERY 4 HOURS PRN
Qty: 12 TAB | Refills: 0 | Status: SHIPPED | OUTPATIENT
Start: 2019-08-16 | End: 2019-08-19

## 2019-08-16 RX ORDER — KETOROLAC TROMETHAMINE 30 MG/ML
30 INJECTION, SOLUTION INTRAMUSCULAR; INTRAVENOUS ONCE
Status: COMPLETED | OUTPATIENT
Start: 2019-08-16 | End: 2019-08-16

## 2019-08-16 RX ORDER — ONDANSETRON 4 MG/1
4 TABLET, ORALLY DISINTEGRATING ORAL ONCE
Status: COMPLETED | OUTPATIENT
Start: 2019-08-16 | End: 2019-08-16

## 2019-08-16 RX ADMIN — KETOROLAC TROMETHAMINE 30 MG: 30 INJECTION, SOLUTION INTRAMUSCULAR at 12:02

## 2019-08-16 RX ADMIN — HYDROMORPHONE HYDROCHLORIDE 1 MG: 1 INJECTION, SOLUTION INTRAMUSCULAR; INTRAVENOUS; SUBCUTANEOUS at 13:40

## 2019-08-16 RX ADMIN — HYDROMORPHONE HYDROCHLORIDE 1 MG: 1 INJECTION, SOLUTION INTRAMUSCULAR; INTRAVENOUS; SUBCUTANEOUS at 12:02

## 2019-08-16 RX ADMIN — ONDANSETRON 4 MG: 4 TABLET, ORALLY DISINTEGRATING ORAL at 12:02

## 2019-08-16 NOTE — ED NOTES
Notified dr. Zambrano , pt c/o inc pain. Will be about an hour before pt can get to CT. MD informed she would speak with the patient.

## 2019-08-16 NOTE — DISCHARGE INSTRUCTIONS
No heavy lifting or bending for 3 days.  Return for fever, leg weakness (inability to walk or bear weight), loss of bowel or bladder control, fever, rash or any new symptoms or concern.  Take the medications as directed.  Gentle stretching.

## 2019-08-16 NOTE — ED PROVIDER NOTES
ED Provider Note    Scribed for Laila Zambrano M.D. by Fouzia Raza. 8/16/2019, 11:07 AM.    Primary care provider: Zack Bhakta D.N.P.  Means of arrival: Walk in   History obtained from: Patient   History limited by: None    CHIEF COMPLAINT  •  Back pain     HPI  Maryt Pedroza is a 34 y.o. male who presents to the Emergency Department complaining of back pain onset last night. History of chronic back pain secondary to degenerative disk disease. He states that he was woken up last night from sleep with an acute onset of right-sided lower back pain. No relief after Tylenol and states his pain has gradually worsened. He has associated nausea but denies any fevers, chills, radiating pain to his lower extremities, weakness to his lower extremities, testicular pain, bowel or bladder incontinence, dysuria, hematuria, vomiting or diarrhea. He has a history of nephrolithiasis.       REVIEW OF SYSTEMS  Pertinent positives include right sided lower back pain and nausea. Pertinent negatives include no fevers, chills, radiating pain to his lower extremities, weakness to his lower extremities, testicular pain, bowel or bladder incontinence, dysuria, hematuria, vomiting or diarrhea.Denies IV drug abuse. All other systems reviewed and negative. See HPI for further details.      PAST MEDICAL HISTORY  Patient has a past medical history of Anesthesia, Anxiety disorder, Arthritis, Bipolar 1 disorder, Breath shortness, Cancer ([facility]), Carpal tunnel syndrome (7/21/2016), Chickenpox, Chronic back pain (7/21/2016), Chronic obstructive pulmonary disease (HCC), Degenerative disk disease, Depression, Diabetes mellitus, type II (HCC) (7/21/2016), DJD (degenerative joint disease) (7/21/2016), Enlarged heart, Fibromyalgia (7/21/2016), GERD (gastroesophageal reflux disease) (7/21/2016), Heart burn, Hyperlipemia, Hypertension, Indigestion, Influenza, Kidney stone, Nephrolithiasis (7/21/2016), Obesity, Pain, Pancreatitis (3/2014), Peptic  ulcer disease (7/21/2016), Peripheral neuropathy (7/21/2016), Pneumonia, Prolapsed lumbosacral intervertebral disc (7/21/2016), PTSD (post-traumatic stress disorder), Renal disorder, Rheumatoid arthritis (HCC), Skin cancer, Sleep apnea, Snoring, Tobacco abuse (7/21/2016), Type II or unspecified type diabetes mellitus without mention of complication, not stated as uncontrolled, Unspecified asthma(493.90), Unspecified hemorrhagic conditions, and Vitamin D deficiency (7/21/2016).      SURGICAL HISTORY  Patient has a past surgical history that includes tonsillectomy; other orthopedic surgery (2001); other orthopedic surgery (2002); cystoscopy stent placement (5/22/2014); ureteroscopy (5/22/2014); and lasertripsy (5/22/2014).      SOCIAL HISTORY  Social History     Tobacco Use   • Smoking status: Current Every Day Smoker     Packs/day: 1.00     Years: 8.00     Pack years: 8.00     Types: Cigarettes   • Smokeless tobacco: Never Used   Substance Use Topics   • Alcohol use: No   • Drug use: No      Social History     Substance and Sexual Activity   Drug Use No       FAMILY HISTORY  Family History   Problem Relation Age of Onset   • Depression Mother    • Anxiety disorder Mother    • Bipolar disorder Mother    • Seizures Mother    • Heart Attack Father 28   • Diabetes Father    • Hypertension Father        CURRENT MEDICATIONS  No current facility-administered medications for this encounter.     Current Outpatient Medications:   •  busPIRone (BUSPAR) 10 MG Tab, Take 10 mg by mouth every morning., Disp: , Rfl:   •  Dulaglutide (TRULICITY) 1.5 MG/0.5ML Solution Pen-injector, Inject 1.5 mg as instructed every Tuesday., Disp: , Rfl:   •  hydrocodone-acetaminophen (NORCO) 5-325 MG Tab per tablet, Take 1 Tab by mouth 2 times a day as needed (pain)., Disp: , Rfl:   •  tizanidine (ZANAFLEX) 4 MG Tab, Take 4 mg by mouth every 6 hours as needed., Disp: , Rfl:   •  aspirin EC (ECOTRIN) 81 MG Tablet Delayed Response, Take 81 mg by mouth  "every day., Disp: , Rfl:   •  pantoprazole (PROTONIX) 40 MG TBEC, Take 40 mg by mouth every morning., Disp: , Rfl:   •  lithium (ESKALITH) 300 MG TABS, Take 300 mg by mouth every morning., Disp: , Rfl:      ALLERGIES  Allergies   Allergen Reactions   • Pertussis Vaccine Anaphylaxis     Childhood reaction   • Simvastatin Unspecified     Pancreatitis       PHYSICAL EXAM  VITAL SIGNS: /84   Pulse 65   Temp 36.1 °C (97 °F) (Temporal)   Resp 18   Ht 1.803 m (5' 11\")   Wt (!) 151.8 kg (334 lb 10.5 oz)   SpO2 95%   BMI 46.68 kg/m²     Constitutional: Well developed, Well nourished, No acute distress, Non-toxic appearance.   HENT: No facial asymmetry, no midline cervical tenderness.  Cardiovascular: Normal heart rate, Normal rhythm  Thorax & Lungs: Normal breath sounds, No respiratory distress   Abdomen: Bowel sounds normal, Soft, No tenderness, No masses, No pulsatile masses. no saddle anesthesia noted  Skin: Warm, Dry, No erythema, No rash.   Back: Right paraspinal tenderness lower lumbar area, no midline tenderness to palpation, no step-offs, no gross deformity,  no patellar reflex asymmetry.  Extremities: Intact distal pulses, No edema, No tenderness, normal dorsiflexion of great toe bilaterally, +5/5 strength in lower extremity, intact sensation to light touch,   Neurologic: Alert & oriented x 3, Normal motor function, Normal sensory function, No focal deficits noted. Normal gait.  Psych: alert, appropriate                                   DIAGNOSTIC STUDIES / PROCEDURES\    LABS  Results for orders placed or performed during the hospital encounter of 08/16/19   URINALYSIS CULTURE, IF INDICATED   Result Value Ref Range    Color DK Yellow     Character Clear     Specific Gravity 1.038 <1.035    Ph 5.5 5.0 - 8.0    Glucose >=1000 (A) Negative mg/dL    Ketones Trace (A) Negative mg/dL    Protein Negative Negative mg/dL    Bilirubin Negative Negative    Urobilinogen, Urine 1.0 Negative    Nitrite Negative " Negative    Leukocyte Esterase Negative Negative    Occult Blood Negative Negative    Micro Urine Req see below    CBC WITH DIFFERENTIAL   Result Value Ref Range    WBC 8.4 4.8 - 10.8 K/uL    RBC 4.83 4.70 - 6.10 M/uL    Hemoglobin 15.3 14.0 - 18.0 g/dL    Hematocrit 43.0 42.0 - 52.0 %    MCV 89.0 81.4 - 97.8 fL    MCH 31.7 27.0 - 33.0 pg    MCHC 35.6 (H) 33.7 - 35.3 g/dL    RDW 35.8 (L) 35.9 - 50.0 fL    Platelet Count 210 164 - 446 K/uL    MPV 9.2 9.0 - 12.9 fL    Neutrophils-Polys 52.10 44.00 - 72.00 %    Lymphocytes 30.00 22.00 - 41.00 %    Monocytes 5.50 0.00 - 13.40 %    Eosinophils 10.60 (H) 0.00 - 6.90 %    Basophils 1.40 0.00 - 1.80 %    Immature Granulocytes 0.40 0.00 - 0.90 %    Nucleated RBC 0.00 /100 WBC    Neutrophils (Absolute) 4.40 1.82 - 7.42 K/uL    Lymphs (Absolute) 2.53 1.00 - 4.80 K/uL    Monos (Absolute) 0.46 0.00 - 0.85 K/uL    Eos (Absolute) 0.89 (H) 0.00 - 0.51 K/uL    Baso (Absolute) 0.12 0.00 - 0.12 K/uL    Immature Granulocytes (abs) 0.03 0.00 - 0.11 K/uL    NRBC (Absolute) 0.00 K/uL   COMP METABOLIC PANEL   Result Value Ref Range    Sodium 137 135 - 145 mmol/L    Potassium 4.0 3.6 - 5.5 mmol/L    Chloride 102 96 - 112 mmol/L    Co2 26 20 - 33 mmol/L    Anion Gap 9.0 0.0 - 11.9    Glucose 258 (H) 65 - 99 mg/dL    Bun 14 8 - 22 mg/dL    Creatinine 0.86 0.50 - 1.40 mg/dL    Calcium 9.5 8.5 - 10.5 mg/dL    AST(SGOT) 16 12 - 45 U/L    ALT(SGPT) 27 2 - 50 U/L    Alkaline Phosphatase 66 30 - 99 U/L    Total Bilirubin 0.4 0.1 - 1.5 mg/dL    Albumin 4.4 3.2 - 4.9 g/dL    Total Protein 7.2 6.0 - 8.2 g/dL    Globulin 2.8 1.9 - 3.5 g/dL    A-G Ratio 1.6 g/dL   LITHIUM   Result Value Ref Range    Lithium 0.7 0.6 - 1.2 mmol/L   ESTIMATED GFR   Result Value Ref Range    GFR If African American >60 >60 mL/min/1.73 m 2    GFR If Non African American >60 >60 mL/min/1.73 m 2      All labs reviewed by me.      RADIOLOGY  CT-RENAL COLIC EVALUATION(A/P W/O)   Final Result      No acute intra-abdominal  findings.        The radiologist's interpretation of all radiological studies have been reviewed by me.      COURSE & MEDICAL DECISION MAKING  Nursing notes, VS, PMSFHx reviewed in chart.     Patient presents to emergency department with complaint of right lower lumbar pain.  Patient has a history of degenerative disc disease.  He also has a history of kidney stones.  On exam patient has no abdominal pain.  No ripping or tearing back pain and I doubt dissection.  Patient does not have any bowel or bladder incontinence.  He has no signs on exam that would suggest acute cord compression.  Patient has not had any fevers and I doubt an infectious etiology for his back pain.  Plan at this time is doing a CT to evaluate for possible kidney stone.  We will check laboratory studies give IM pain medication and monitor him closely.    11:07 AM Patient seen and examined at bedside for right sided lower back pain. Ordered for CT renal colic, Lithium, CBC w/differentials, CMP, UA to evaluate. Patient was treated with Toradol 30 mg IM, Dilaudid 1 mg IM, Zofran 4 mg PO  for his symptoms.    Laboratory studies have returned and show normal white count without evidence of bandemia.  Glucose is elevated but there is no evidence of a gap acidosis and CO2 is 26.  Urine shows large glucose.  Patient has no blood in the urine and no evidence of infection.  Therefore I likely suspect this is musculoskeletal in etiology.  Patient had some relief with the Toradol and Dilaudid but requests another dose.  He is alert awake and therefore patient was redosed.      There is no unexplained fever and back pain or IV drug use to suggest an epidural abscess, there is no neurologic deficit or decreased bowel and bladder control or saddle anesthesia to suggest cauda equina syndrome. The patient has had no syncope or abdominal pain, I do not suspect acute aortic aneurysm.    Emergent MRI is not currently indicated, pt is in understanding to return if  they develop weakness, saddle paresthesias, severe worsening of pain, bowel or bladder incontinence, fever, inability to ambulate or they have any other concerns.    12:24 PM.  CT shows no evidence of kidney stone.  No other acute intra-abdominal pathology.  Patient is stable for outpatient management.  Will be treated with pain medication and steroid Dosepak.  He is to follow-up with his regular doctor.  Back pain precautions were given.    In prescribing controlled substances to this patient, I certify that I have obtained and reviewed the medical history of Marty Pedroza. I have also made a good tatiana effort to obtain applicable records from other providers who have treated the patient and no other records are available at this time.     I have conducted a physical exam and documented it. I have reviewed Mr. Pedroza’s prescription history as maintained by the [place] Prescription Monitoring Program.     I have assessed the patient’s risk for abuse, dependency, and addiction using the validated Opioid Risk Tool available at https://www.mdcalc.com/pzuiuv-burx-pfrn-ort-narcotic-abuse.     Given the above, I believe the benefits of controlled substance therapy outweigh the risks. The reasons for prescribing controlled substances include non-narcotic, oral analgesic alternatives have been inadequate for pain control. Accordingly, I have discussed the risk and benefits, treatment plan, and alternative therapies with the patient.         DISPOSITION  Patient will be discharged home in stable condition.      FOLLOW UP  CATE Gay.P.  [address]  [address]  [place] [id number] [phone]      If symptoms worsen, return to the er.      The patient is referred to a primary physician for blood pressure management, diabetic screening, and for all other preventative health concerns.      OUTPATIENT MEDICATIONS  New Prescriptions    HYDROCODONE-ACETAMINOPHEN (NORCO) 5-325 MG TAB PER TABLET    Take 1 Tab  by mouth every four hours as needed for up to 3 days.    METHYLPREDNISOLONE (MEDROL DOSEPAK) 4 MG TABLET THERAPY PACK    Use as directed       DIAGNOSIS  1. Acute right-sided low back pain without sciatica               Fouzia LAZCANO (Naila), am scribing for, and in the presence of, Laila Zambrano M.D.    Electronically signed by: Fouzia Raza (Naila), 8/16/2019    ILaila M.D. personally performed the services described in this documentation, as scribed by Fouzia Raza in my presence, and it is both accurate and complete. C.    The note accurately reflects work and decisions made by me.  Laila Zambrano  8/16/2019  2:28 PM

## 2019-08-16 NOTE — ED TRIAGE NOTES
"Chief Complaint   Patient presents with   • Low Back Pain     Right low back pain     ./84   Pulse 65   Temp 36.1 °C (97 °F) (Temporal)   Resp 18   Ht 1.803 m (5' 11\")   Wt (!) 151.8 kg (334 lb 10.5 oz)   SpO2 95%   BMI 46.68 kg/m²     Ambulatory to triage with above complaints, hx of herniated discs, also hx of kidney stones, denies recent trauma, denies urinary complaints, SOSA =, denies N/T, given urine specimen cup and clean catch instructions, educated on triage process, placed in lobby, told to inform staff of any changes in condition.    "

## 2019-08-16 NOTE — ED NOTES
Pt aox4, resp equal unlabored, moving all extremities, steady gait on ambulation. Given prescription for norco and steriods. Pt to rest and ice. Return to ed for worsening symptoms. Follow up with pcp

## 2020-01-21 NOTE — PROGRESS NOTES
Pt called requesting refills on   Omerprazole and Xanax.     Please send to [facility].    Pt sleeping comfortably, easily awakens, denies pain or needs.  Call light and personal possessions within reach, will continue to monitor.